# Patient Record
Sex: FEMALE | Race: WHITE | URBAN - METROPOLITAN AREA
[De-identification: names, ages, dates, MRNs, and addresses within clinical notes are randomized per-mention and may not be internally consistent; named-entity substitution may affect disease eponyms.]

---

## 2021-02-18 ENCOUNTER — OFFICE VISIT (OUTPATIENT)
Dept: NEPHROLOGY | Facility: MEDICAL CENTER | Age: 81
End: 2021-02-18
Payer: MEDICARE

## 2021-02-18 VITALS
RESPIRATION RATE: 16 BRPM | HEART RATE: 84 BPM | BODY MASS INDEX: 29.64 KG/M2 | OXYGEN SATURATION: 94 % | TEMPERATURE: 97.7 F | WEIGHT: 147 LBS | SYSTOLIC BLOOD PRESSURE: 136 MMHG | DIASTOLIC BLOOD PRESSURE: 74 MMHG | HEIGHT: 59 IN

## 2021-02-18 DIAGNOSIS — E55.9 VITAMIN D DEFICIENCY: ICD-10-CM

## 2021-02-18 DIAGNOSIS — I10 ESSENTIAL HYPERTENSION: ICD-10-CM

## 2021-02-18 DIAGNOSIS — N25.81 HYPERPARATHYROIDISM DUE TO RENAL INSUFFICIENCY (HCC): ICD-10-CM

## 2021-02-18 DIAGNOSIS — D64.9 ANEMIA, UNSPECIFIED TYPE: ICD-10-CM

## 2021-02-18 DIAGNOSIS — N18.4 CKD (CHRONIC KIDNEY DISEASE), STAGE IV (HCC): ICD-10-CM

## 2021-02-18 PROCEDURE — 99204 OFFICE O/P NEW MOD 45 MIN: CPT | Performed by: INTERNAL MEDICINE

## 2021-02-18 RX ORDER — CHOLECALCIFEROL (VITAMIN D3) 1MM UNIT/G
LIQUID (ML) MISCELLANEOUS
COMMUNITY

## 2021-02-18 RX ORDER — NYSTATIN 100000 U/G
100000 CREAM TOPICAL
COMMUNITY
Start: 2021-01-18

## 2021-02-18 RX ORDER — LANOLIN ALCOHOL/MO/W.PET/CERES
1000 CREAM (GRAM) TOPICAL DAILY
COMMUNITY

## 2021-02-18 RX ORDER — LEVOTHYROXINE SODIUM 0.1 MG/1
100 TABLET ORAL
COMMUNITY
Start: 2020-12-02

## 2021-02-18 RX ORDER — OXICONAZOLE NITRATE 10 MG/G
CREAM TOPICAL
COMMUNITY

## 2021-02-18 RX ORDER — OLOPATADINE HYDROCHLORIDE 7 MG/ML
0.7 SOLUTION OPHTHALMIC
COMMUNITY
Start: 2020-11-30

## 2021-02-18 RX ORDER — ONDANSETRON 4 MG/1
4 TABLET, ORALLY DISINTEGRATING ORAL
COMMUNITY
Start: 2021-01-06

## 2021-02-18 RX ORDER — AMLODIPINE BESYLATE 2.5 MG/1
2.5 TABLET ORAL DAILY
COMMUNITY
End: 2022-03-10

## 2021-02-18 RX ORDER — SCOLOPAMINE TRANSDERMAL SYSTEM 1 MG/1
1 PATCH, EXTENDED RELEASE TRANSDERMAL
COMMUNITY
End: 2023-08-09

## 2021-02-18 ASSESSMENT — ENCOUNTER SYMPTOMS
HEMOPTYSIS: 0
SINUS PAIN: 0
NAUSEA: 1
WEIGHT LOSS: 0
EYES NEGATIVE: 1
BACK PAIN: 1
COUGH: 0
MYALGIAS: 1
NECK PAIN: 0
CHILLS: 0
DIARRHEA: 0
FLANK PAIN: 0
ABDOMINAL PAIN: 0
WHEEZING: 0
ORTHOPNEA: 0
SHORTNESS OF BREATH: 0
PALPITATIONS: 0
FEVER: 0
VOMITING: 0

## 2021-02-19 NOTE — PROGRESS NOTES
Subjective:      Rosa Umanzor is a 80 y.o. female who presents with New Patient and Chronic Kidney Disease            HPI  Rosa is coming today for initial evaluation of CKD III with progression to stage IV  CKD: creat 1.3 in Oct 2020 - worse in Jan 2021 to 1.7-1.8  Had UTI treated with Keflex  Also sopped Lisinopril replaced with CCB  Doing better, no complaints except recurrent nausea.  No vomiting , no abdominal pain, no diarrhea  No dysuria/hematuria/flank pain  HTN: BP well controlled    Review of Systems   Constitutional: Negative for chills, fever, malaise/fatigue and weight loss.   HENT: Negative for congestion, hearing loss and sinus pain.    Eyes: Negative.    Respiratory: Negative for cough, hemoptysis, shortness of breath and wheezing.    Cardiovascular: Negative for chest pain, palpitations, orthopnea and leg swelling.   Gastrointestinal: Positive for nausea. Negative for abdominal pain, diarrhea and vomiting.   Genitourinary: Negative for dysuria, flank pain, frequency, hematuria and urgency.   Musculoskeletal: Positive for back pain and myalgias. Negative for neck pain.   Skin: Negative.    All other systems reviewed and are negative.    Past Medical History:   Diagnosis Date   • Arthritis    • Hyperlipidemia    • Hypertension    • Kidney disease        Family History   Problem Relation Age of Onset   • Heart Disease Mother        Social History     Socioeconomic History   • Marital status:      Spouse name: Not on file   • Number of children: Not on file   • Years of education: Not on file   • Highest education level: Not on file   Occupational History   • Not on file   Tobacco Use   • Smoking status: Never Smoker   • Smokeless tobacco: Never Used   Substance and Sexual Activity   • Alcohol use: Never   • Drug use: Never   • Sexual activity: Not on file   Other Topics Concern   • Not on file   Social History Narrative   • Not on file     Social Determinants of Health     Financial  "Resource Strain:    • Difficulty of Paying Living Expenses:    Food Insecurity:    • Worried About Running Out of Food in the Last Year:    • Ran Out of Food in the Last Year:    Transportation Needs:    • Lack of Transportation (Medical):    • Lack of Transportation (Non-Medical):    Physical Activity:    • Days of Exercise per Week:    • Minutes of Exercise per Session:    Stress:    • Feeling of Stress :    Social Connections:    • Frequency of Communication with Friends and Family:    • Frequency of Social Gatherings with Friends and Family:    • Attends [employer] Services:    • Active Member of Clubs or Organizations:    • Attends Club or Organization Meetings:    • Marital Status:    Intimate Partner Violence:    • Fear of Current or Ex-Partner:    • Emotionally Abused:    • Physically Abused:    • Sexually Abused:           Objective:     /74 (BP Location: Right arm, Patient Position: Sitting)   Pulse 84   Temp 36.5 °C (97.7 °F)   Resp 16   Ht 1.499 m (4' 11\")   Wt 66.7 kg (147 lb)   SpO2 94%   BMI 29.69 kg/m²      Physical Exam  Vitals and nursing note reviewed.   Constitutional:       General: She is not in acute distress.     Appearance: Normal appearance. She is well-developed. She is not diaphoretic.   HENT:      Head: Normocephalic and atraumatic.      Nose: Nose normal.      Mouth/Throat:      Mouth: Mucous membranes are moist.      Pharynx: Oropharynx is clear.   Eyes:      Extraocular Movements: Extraocular movements intact.      Conjunctiva/sclera: Conjunctivae normal.      Pupils: Pupils are equal, round, and reactive to light.   Cardiovascular:      Rate and Rhythm: Normal rate and regular rhythm.      Pulses: Normal pulses.      Heart sounds: Normal heart sounds. No friction rub. No gallop.    Pulmonary:      Effort: Pulmonary effort is normal. No respiratory distress.      Breath sounds: Normal breath sounds. No wheezing, rhonchi or rales.   Abdominal:      General: Bowel sounds are " normal. There is no distension.      Palpations: Abdomen is soft. There is no mass.      Tenderness: There is no abdominal tenderness. There is no right CVA tenderness, left CVA tenderness or guarding.   Musculoskeletal:         General: Normal range of motion.      Cervical back: Normal range of motion and neck supple.      Right lower leg: No edema.      Left lower leg: No edema.   Skin:     General: Skin is warm.      Coloration: Skin is not jaundiced.      Findings: No erythema or rash.   Neurological:      General: No focal deficit present.      Mental Status: She is alert and oriented to person, place, and time.      Cranial Nerves: No cranial nerve deficit.      Coordination: Coordination normal.   Psychiatric:         Mood and Affect: Mood normal.         Behavior: Behavior normal.         Thought Content: Thought content normal.         Judgment: Judgment normal.            Laboratory results and US reviewed: d/w Pt  Creat 1.8  US no hydronephrosis       Assessment/Plan:        1. CKD (chronic kidney disease), stage IV ([facility])      To monitor closely, to repeat renal panel and call clinic to discuss results  - Basic Metabolic Panel; Future  - PTH INTACT (PTH ONLY); Future  - MICROALB/CREAT RATIO, RANDOM UR  - REFERRAL TO TELEMEDICINE    2. Essential hypertension      BP well controlled -to monitor  - Basic Metabolic Panel; Future  - MICROALB/CREAT RATIO, RANDOM UR  - REFERRAL TO TELEMEDICINE    3. Anemia, unspecified type      Hb stable -to monitor  - REFERRAL TO TELEMEDICINE    4. Vitamin D deficiency        - VITAMIN D 25-HYDROXY  - REFERRAL TO TELEMEDICINE    5. Hyperparathyroidism due to renal insufficiency (HCC)    - PTH INTACT (PTH ONLY); Future  - VITAMIN D 25-HYDROXY  - REFERRAL TO TELEMEDICINE    Recs:  Keep well hydrated  Low salt diet  Monitor BP  Avoid NSAID's  Repeat renal panel  F/u in 1 month

## 2021-03-05 ENCOUNTER — TELEPHONE (OUTPATIENT)
Dept: NEPHROLOGY | Facility: MEDICAL CENTER | Age: 81
End: 2021-03-05

## 2021-03-31 ENCOUNTER — TELEMEDICINE2 (OUTPATIENT)
Dept: NEPHROLOGY | Facility: MEDICAL CENTER | Age: 81
End: 2021-03-31
Payer: MEDICARE

## 2021-03-31 VITALS
BODY MASS INDEX: 29.31 KG/M2 | SYSTOLIC BLOOD PRESSURE: 126 MMHG | HEART RATE: 71 BPM | DIASTOLIC BLOOD PRESSURE: 67 MMHG | WEIGHT: 145.4 LBS | OXYGEN SATURATION: 93 % | TEMPERATURE: 98.3 F | HEIGHT: 59 IN

## 2021-03-31 DIAGNOSIS — I10 ESSENTIAL HYPERTENSION: ICD-10-CM

## 2021-03-31 DIAGNOSIS — N18.32 STAGE 3B CHRONIC KIDNEY DISEASE: ICD-10-CM

## 2021-03-31 DIAGNOSIS — E55.9 VITAMIN D DEFICIENCY: ICD-10-CM

## 2021-03-31 DIAGNOSIS — D64.9 ANEMIA, UNSPECIFIED TYPE: ICD-10-CM

## 2021-03-31 PROCEDURE — 99214 OFFICE O/P EST MOD 30 MIN: CPT | Mod: 95 | Performed by: INTERNAL MEDICINE

## 2021-03-31 ASSESSMENT — ENCOUNTER SYMPTOMS
BACK PAIN: 1
CHILLS: 0
HEMOPTYSIS: 0
MYALGIAS: 1
SINUS PAIN: 0
FLANK PAIN: 0
PALPITATIONS: 0
ABDOMINAL PAIN: 0
NAUSEA: 1
COUGH: 0
FEVER: 0
WHEEZING: 0
EYES NEGATIVE: 1
MYALGIAS: 0
DIARRHEA: 0
NAUSEA: 0
NECK PAIN: 0
SHORTNESS OF BREATH: 0
ORTHOPNEA: 0
VOMITING: 0
WEIGHT LOSS: 0

## 2021-03-31 NOTE — PROGRESS NOTES
Telemedicine Note     Please note that I could not evaluate the patient in person at the site. All examination and evaluation of the patient and information gathering from the patient and/or the family were done jointly by the requesting physician, Dr. Luz Elena vasquez. providers found at the site and me via licensed and securely encrypted tele-video communication equipment with the assistance of a trained tele-presenter at the originating site.  As such, my assessments and recommendations are limited as far as such telecommnication allows.     Consulting MD: Christelle Romo M.D.    Consulting MD location: [place], [place]  Requesting Physician: Good Mayer DO  Patient name:      Rosa Umanzor  :                    1940   MRN:                   @  [id number]  Data source:  Patient  Video Time:         15 minutes.   Originating site: [facility] site [place]     Date of Service: 3/31/2021     Chief Complaint:   Chief Complaint   Patient presents with   • Chronic Kidney Disease        History of Present Illness:  Rosa Umanzor is a 80 y.o. female who presents today for    CKD III: creat 1.3 in Oct 2020 - worse in 2021 to 1.7-1.8 -improved to 1.3  Doing better, no complaints except low back pain -No NSAID's  No vomiting , no abdominal pain, no diarrhea  No dysuria/hematuria/flank pain  HTN: BP well controlled         ROS:  (Change to below. Must have 10 or more organ system for the highest level code).   Review of Systems   Constitutional: Negative for chills, fever, malaise/fatigue and weight loss.   HENT: Negative for congestion, hearing loss and sinus pain.    Eyes: Negative.    Respiratory: Negative for cough, hemoptysis, shortness of breath and wheezing.    Cardiovascular: Negative for chest pain, palpitations, orthopnea and leg swelling.   Gastrointestinal: Negative for abdominal pain, diarrhea, nausea and vomiting.   Musculoskeletal: Positive for back pain. Negative for myalgias and neck pain.   Skin:  "Negative.                Current Outpatient Medications:   •  levothyroxine (SYNTHROID) 100 MCG Tab, 100 mcg., Disp: , Rfl:   •  nystatin (MYCOSTATIN) 862229 UNIT/GM Cream topical cream, 100,000 g., Disp: , Rfl:   •  ondansetron (ZOFRAN ODT) 4 MG TABLET DISPERSIBLE, 4 mg., Disp: , Rfl:   •  PAZEO 0.7 % Solution, 0.7 %., Disp: , Rfl:   •  amLODIPine (NORVASC) 2.5 MG Tab, Take 2.5 mg by mouth every day., Disp: , Rfl:   •  scopolamine (TRANSDERM-SCOP, 1.5 MG,) 1 mg/72hr PATCH 72 HR, Place 1 Patch on the skin every 72 hours., Disp: , Rfl:   •  Cyanocobalamin (VITAMIN B-12) 1000 MCG Tab, Take 1,000 mcg by mouth every day., Disp: , Rfl:   •  Cholecalciferol (VITAMIN D3) 2932951 UNIT/GM Liquid, , Disp: , Rfl:   •  Oxiconazole Nitrate 1 % Cream, Apply  topically., Disp: , Rfl:   •  Magnesium 400 MG Cap, Take  by mouth., Disp: , Rfl:         Past Medical History:  Past Medical History:   Diagnosis Date   • Arthritis    • Hyperlipidemia    • Hypertension    • Kidney disease        Allergies:   Allergies   Allergen Reactions   • Amoxicillin    • Bactrim Ds    • Celexa    • Cleocin-T    • Codeine    • Crestor [Rosuvastatin Calcium]    • Cymbalta [Duloxetine Hcl]    • Darvocet [Propoxyphene N-Apap]    • Fentanyl    • Hydrocodone    • Keflex    • Niacin    • Nucynta [Tapentadol]    • Ultram [Tramadol]    • Vicodin [Hydrocodone-Acetaminophen]    • Vytorin         Current Outpatient Medications:   reviewed        Physical Exam:   Vitals:   Vitals:    03/31/21 1020   BP: 126/67   Pulse: 71   Temp: 36.8 °C (98.3 °F)   SpO2: 93%   Weight: 66 kg (145 lb 6.4 oz)   Height: 1.499 m (4' 11\")     Completed by medical staff  GEN: comfortable, in NAD   HEENT: Hearings appears normal   Oropharynx clear, nose normal  NECK: appears supple, unable to check for thyroid or lymphadenopathy.  CV: RRR, no rub/S3   PULM: CTA, no rales, no wheezes, good effort  ABD: appears soft, nontender, not distended.  Ext: unable to check but RN reports 2+ pedal " pulses.  SKIN: No obvious rash or lesion noted.   Psychiatric: normal mood and affect, alert and oriented to self, place, persons and time.           Imaging reviewed & Visualized by me:  DIAGNOSTIC IMAGING REVIEWED AND/OR INTERPRETED BY ME       Laboratory:   reviewed               Assessment and Plan:        1. Stage 3b chronic kidney disease      Creat level improved -back to baseline -at 1.3    2. Essential hypertension      BP very well controlled    3. Anemia, unspecified type      Well controlled    4. Vitamin D deficiency      At goal -continue supplementation      Recs:     Continue current treatment  Monitor BP    Low salt diet  Avoid NSAID's    RTC in 4 months    I spent total of 15minutes on face-to-face via telemonitor more than half of which was spent coordinating this with patient as well as reviewing the images and labs results with the patientt, and answering all questions and explaining in details the assessment and recommendation sections above. The patient expressed her understanding and agreement to the above.        This EM service was conducted utilizing secure and encrypted videoconferencing equipment with the assistance of a trained tele-presenter at the originating site.

## 2021-03-31 NOTE — PROGRESS NOTES
Subjective:      Rosa Umanzor is a 80 y.o. female who presents with Chronic Kidney Disease            Chronic Kidney Disease  Associated symptoms include myalgias and nausea. Pertinent negatives include no abdominal pain, chest pain, chills, congestion, coughing, fever, neck pain or vomiting.     Rosa is coming today for initial evaluation of CKD III with progression to stage IV  CKD: creat 1.3 in Oct 2020 - worse in Jan 2021 to 1.7-1.8  Had UTI treated with Keflex  Also sopped Lisinopril replaced with CCB  Doing better, no complaints except recurrent nausea.  No vomiting , no abdominal pain, no diarrhea  No dysuria/hematuria/flank pain  HTN: BP well controlled    Review of Systems   Constitutional: Negative for chills, fever, malaise/fatigue and weight loss.   HENT: Negative for congestion, hearing loss and sinus pain.    Eyes: Negative.    Respiratory: Negative for cough, hemoptysis, shortness of breath and wheezing.    Cardiovascular: Negative for chest pain, palpitations, orthopnea and leg swelling.   Gastrointestinal: Positive for nausea. Negative for abdominal pain, diarrhea and vomiting.   Genitourinary: Negative for dysuria, flank pain, frequency, hematuria and urgency.   Musculoskeletal: Positive for back pain and myalgias. Negative for neck pain.   Skin: Negative.    All other systems reviewed and are negative.    Past Medical History:   Diagnosis Date   • Arthritis    • Hyperlipidemia    • Hypertension    • Kidney disease        Family History   Problem Relation Age of Onset   • Heart Disease Mother        Social History     Socioeconomic History   • Marital status:      Spouse name: Not on file   • Number of children: Not on file   • Years of education: Not on file   • Highest education level: Not on file   Occupational History   • Not on file   Tobacco Use   • Smoking status: Never Smoker   • Smokeless tobacco: Never Used   Substance and Sexual Activity   • Alcohol use: Never   • Drug use:  Never   • Sexual activity: Not on file   Other Topics Concern   • Not on file   Social History Narrative   • Not on file     Social Determinants of Health     Financial Resource Strain:    • Difficulty of Paying Living Expenses:    Food Insecurity:    • Worried About Running Out of Food in the Last Year:    • Ran Out of Food in the Last Year:    Transportation Needs:    • Lack of Transportation (Medical):    • Lack of Transportation (Non-Medical):    Physical Activity:    • Days of Exercise per Week:    • Minutes of Exercise per Session:    Stress:    • Feeling of Stress :    Social Connections:    • Frequency of Communication with Friends and Family:    • Frequency of Social Gatherings with Friends and Family:    • Attends [employer] Services:    • Active Member of Clubs or Organizations:    • Attends Club or Organization Meetings:    • Marital Status:    Intimate Partner Violence:    • Fear of Current or Ex-Partner:    • Emotionally Abused:    • Physically Abused:    • Sexually Abused:           Objective:     There were no vitals taken for this visit.     Physical Exam  Vitals and nursing note reviewed.   Constitutional:       General: She is not in acute distress.     Appearance: Normal appearance. She is well-developed. She is not diaphoretic.   HENT:      Head: Normocephalic and atraumatic.      Nose: Nose normal.      Mouth/Throat:      Mouth: Mucous membranes are moist.      Pharynx: Oropharynx is clear.   Eyes:      Extraocular Movements: Extraocular movements intact.      Conjunctiva/sclera: Conjunctivae normal.      Pupils: Pupils are equal, round, and reactive to light.   Cardiovascular:      Rate and Rhythm: Normal rate and regular rhythm.      Pulses: Normal pulses.      Heart sounds: Normal heart sounds. No friction rub. No gallop.    Pulmonary:      Effort: Pulmonary effort is normal. No respiratory distress.      Breath sounds: Normal breath sounds. No wheezing, rhonchi or rales.   Abdominal:       General: Bowel sounds are normal. There is no distension.      Palpations: Abdomen is soft. There is no mass.      Tenderness: There is no abdominal tenderness. There is no right CVA tenderness, left CVA tenderness or guarding.   Musculoskeletal:         General: Normal range of motion.      Cervical back: Normal range of motion and neck supple.      Right lower leg: No edema.      Left lower leg: No edema.   Skin:     General: Skin is warm.      Coloration: Skin is not jaundiced.      Findings: No erythema or rash.   Neurological:      General: No focal deficit present.      Mental Status: She is alert and oriented to person, place, and time.      Cranial Nerves: No cranial nerve deficit.      Coordination: Coordination normal.   Psychiatric:         Mood and Affect: Mood normal.         Behavior: Behavior normal.         Thought Content: Thought content normal.         Judgment: Judgment normal.            Laboratory results and US reviewed: d/w Pt  Creat 1.8  US no hydronephrosis       Assessment/Plan:        1. CKD (chronic kidney disease), stage IV ([facility])      To monitor closely, to repeat renal panel and call clinic to discuss results  - Basic Metabolic Panel; Future  - PTH INTACT (PTH ONLY); Future  - MICROALB/CREAT RATIO, RANDOM UR  - REFERRAL TO TELEMEDICINE    2. Essential hypertension      BP well controlled -to monitor  - Basic Metabolic Panel; Future  - MICROALB/CREAT RATIO, RANDOM UR  - REFERRAL TO TELEMEDICINE    3. Anemia, unspecified type      Hb stable -to monitor  - REFERRAL TO TELEMEDICINE    4. Vitamin D deficiency        - VITAMIN D 25-HYDROXY  - REFERRAL TO TELEMEDICINE    5. Hyperparathyroidism due to renal insufficiency (HCC)    - PTH INTACT (PTH ONLY); Future  - VITAMIN D 25-HYDROXY  - REFERRAL TO TELEMEDICINE    Recs:  Keep well hydrated  Low salt diet  Monitor BP  Avoid NSAID's  Repeat renal panel  F/u in 1 month

## 2021-04-02 ENCOUNTER — TELEPHONE (OUTPATIENT)
Dept: NEPHROLOGY | Facility: MEDICAL CENTER | Age: 81
End: 2021-04-02

## 2021-04-02 NOTE — TELEPHONE ENCOUNTER
Patient left voicemail regarding her creatinine level. Patient was told during her tele health visit her creatinine level decreased and patient is curious as to what stage she is at now?   Please advise

## 2021-07-20 ENCOUNTER — TELEPHONE (OUTPATIENT)
Dept: NEPHROLOGY | Facility: MEDICAL CENTER | Age: 81
End: 2021-07-20

## 2021-07-20 NOTE — TELEPHONE ENCOUNTER
Pt states that she needs new lab orders and would like GLU & MAGNESIUM checked too. She also mentioned that she will need to schedule here 4mo [facility] for August. Is that okay?

## 2021-07-21 DIAGNOSIS — I10 ESSENTIAL HYPERTENSION: ICD-10-CM

## 2021-07-21 DIAGNOSIS — E55.9 VITAMIN D DEFICIENCY: ICD-10-CM

## 2021-07-21 DIAGNOSIS — D64.9 ANEMIA, UNSPECIFIED TYPE: ICD-10-CM

## 2021-07-21 DIAGNOSIS — N18.32 STAGE 3B CHRONIC KIDNEY DISEASE: ICD-10-CM

## 2021-08-18 ENCOUNTER — TELEPHONE (OUTPATIENT)
Dept: NEPHROLOGY | Facility: MEDICAL CENTER | Age: 81
End: 2021-08-18

## 2021-08-18 NOTE — TELEPHONE ENCOUNTER
Dr Romo patient... Taking Zyrtec OTC in am..    Wants to know if ok with her kidneys.    Please Advise    I can call her back.    thanks

## 2021-09-16 ENCOUNTER — OFFICE VISIT (OUTPATIENT)
Dept: NEPHROLOGY | Facility: MEDICAL CENTER | Age: 81
End: 2021-09-16
Payer: MEDICARE

## 2021-09-16 VITALS
HEIGHT: 59 IN | BODY MASS INDEX: 29.71 KG/M2 | WEIGHT: 147.38 LBS | DIASTOLIC BLOOD PRESSURE: 74 MMHG | SYSTOLIC BLOOD PRESSURE: 132 MMHG | HEART RATE: 66 BPM | OXYGEN SATURATION: 97 % | TEMPERATURE: 98.6 F

## 2021-09-16 DIAGNOSIS — N25.81 HYPERPARATHYROIDISM DUE TO RENAL INSUFFICIENCY (HCC): ICD-10-CM

## 2021-09-16 DIAGNOSIS — E55.9 VITAMIN D DEFICIENCY: ICD-10-CM

## 2021-09-16 DIAGNOSIS — N18.31 STAGE 3A CHRONIC KIDNEY DISEASE: ICD-10-CM

## 2021-09-16 DIAGNOSIS — D64.9 ANEMIA, UNSPECIFIED TYPE: ICD-10-CM

## 2021-09-16 DIAGNOSIS — I10 ESSENTIAL HYPERTENSION: ICD-10-CM

## 2021-09-16 DIAGNOSIS — R80.9 MICROALBUMINURIA: ICD-10-CM

## 2021-09-16 PROCEDURE — 99214 OFFICE O/P EST MOD 30 MIN: CPT | Performed by: INTERNAL MEDICINE

## 2021-09-16 RX ORDER — ALBUTEROL SULFATE 90 UG/1
AEROSOL, METERED RESPIRATORY (INHALATION) PRN
COMMUNITY
Start: 2021-07-16

## 2021-09-16 ASSESSMENT — ENCOUNTER SYMPTOMS
COUGH: 0
VOMITING: 0
HEMOPTYSIS: 0
ORTHOPNEA: 0
EYES NEGATIVE: 1
CHILLS: 0
NAUSEA: 1
WHEEZING: 0
SINUS PAIN: 0
FEVER: 0
ABDOMINAL PAIN: 0
DIARRHEA: 0
SHORTNESS OF BREATH: 0
PALPITATIONS: 0
WEIGHT LOSS: 0

## 2021-09-16 NOTE — PROGRESS NOTES
Subjective:      Rosa Umanzor is a 81 y.o. female who presents with Follow-Up and Chronic Kidney Disease            Chronic Kidney Disease  Associated symptoms include nausea. Pertinent negatives include no abdominal pain, chest pain, chills, congestion, coughing, fever or vomiting.     Rosa is coming today for f/u of CKD III   CKD: creat 1.3 in Oct 2020 - worse in Jan 2021 to 1.7-1.8 -improved to 1.3 -now at 1.1 -CKD IIIa  Doing well, no complaints except mild nausea  No vomiting , no abdominal pain, no diarrhea  No dysuria/hematuria/flank pain  HTN: BP well controlled    Review of Systems   Constitutional: Negative for chills, fever, malaise/fatigue and weight loss.   HENT: Negative for congestion, hearing loss and sinus pain.    Eyes: Negative.    Respiratory: Negative for cough, hemoptysis, shortness of breath and wheezing.    Cardiovascular: Negative for chest pain, palpitations, orthopnea and leg swelling.   Gastrointestinal: Positive for nausea. Negative for abdominal pain, diarrhea and vomiting.   Genitourinary: Negative for dysuria, frequency, hematuria and urgency.   Skin: Negative.    All other systems reviewed and are negative.    Past Medical History:   Diagnosis Date   • Arthritis    • Hyperlipidemia    • Hypertension    • Kidney disease        Family History   Problem Relation Age of Onset   • Heart Disease Mother        Social History     Socioeconomic History   • Marital status:      Spouse name: Not on file   • Number of children: Not on file   • Years of education: Not on file   • Highest education level: Not on file   Occupational History   • Not on file   Tobacco Use   • Smoking status: Never Smoker   • Smokeless tobacco: Never Used   Substance and Sexual Activity   • Alcohol use: Never   • Drug use: Never   • Sexual activity: Not on file   Other Topics Concern   • Not on file   Social History Narrative   • Not on file     Social Determinants of Health     Financial Resource Strain:   "  • Difficulty of Paying Living Expenses:    Food Insecurity:    • Worried About Running Out of Food in the Last Year:    • Ran Out of Food in the Last Year:    Transportation Needs:    • Lack of Transportation (Medical):    • Lack of Transportation (Non-Medical):    Physical Activity:    • Days of Exercise per Week:    • Minutes of Exercise per Session:    Stress:    • Feeling of Stress :    Social Connections:    • Frequency of Communication with Friends and Family:    • Frequency of Social Gatherings with Friends and Family:    • Attends [employer] Services:    • Active Member of Clubs or Organizations:    • Attends Club or Organization Meetings:    • Marital Status:    Intimate Partner Violence:    • Fear of Current or Ex-Partner:    • Emotionally Abused:    • Physically Abused:    • Sexually Abused:           Objective:     /74 (BP Location: Left arm, Patient Position: Sitting, BP Cuff Size: Adult)   Pulse 66   Temp 37 °C (98.6 °F) (Temporal)   Ht 1.499 m (4' 11\")   Wt 66.8 kg (147 lb 6 oz)   SpO2 97%   BMI 29.77 kg/m²      Physical Exam  Vitals reviewed.   Constitutional:       General: She is not in acute distress.     Appearance: Normal appearance. She is well-developed. She is not diaphoretic.   HENT:      Head: Normocephalic and atraumatic.      Nose: Nose normal.      Mouth/Throat:      Mouth: Mucous membranes are moist.      Pharynx: Oropharynx is clear.   Eyes:      General: No scleral icterus.     Extraocular Movements: Extraocular movements intact.      Conjunctiva/sclera: Conjunctivae normal.      Pupils: Pupils are equal, round, and reactive to light.   Cardiovascular:      Rate and Rhythm: Normal rate and regular rhythm.      Pulses: Normal pulses.      Heart sounds: Normal heart sounds. No friction rub. No gallop.    Pulmonary:      Effort: Pulmonary effort is normal. No respiratory distress.      Breath sounds: Normal breath sounds. No wheezing, rhonchi or rales.   Abdominal:      " General: Bowel sounds are normal. There is no distension.      Palpations: There is no mass.      Tenderness: There is no abdominal tenderness. There is no right CVA tenderness, left CVA tenderness or guarding.   Musculoskeletal:      Cervical back: Normal range of motion and neck supple.      Right lower leg: No edema.      Left lower leg: No edema.   Skin:     General: Skin is warm.   Neurological:      General: No focal deficit present.      Mental Status: She is alert and oriented to person, place, and time.      Cranial Nerves: No cranial nerve deficit.      Coordination: Coordination normal.   Psychiatric:         Mood and Affect: Mood normal.         Behavior: Behavior normal.         Thought Content: Thought content normal.         Judgment: Judgment normal.            Laboratory results and US reviewed: d/w Pt  Creat 1.8  US no hydronephrosis       Assessment/Plan:        1. CKD (chronic kidney disease), stage IV (HCC) -improved to CKD IIIa      To monitor closely      Keep well hydrated      2. Essential hypertension      BP well controlled -to monitor    3. Anemia, unspecified type      Hb stable -to monitor      4. Vitamin D deficiency      Continue supplementation        5. Hyperparathyroidism due to renal insufficiency (HCC)      PTH well controlled -at goal      Recs:  Keep well hydrated  Low salt diet  Monitor BP  Avoid NSAID's  Repeat renal panel  F/u in 6 month

## 2022-03-10 ENCOUNTER — TELEMEDICINE2 (OUTPATIENT)
Dept: NEPHROLOGY | Facility: MEDICAL CENTER | Age: 82
End: 2022-03-10
Payer: MEDICARE

## 2022-03-10 VITALS
BODY MASS INDEX: 32.05 KG/M2 | HEART RATE: 78 BPM | WEIGHT: 159 LBS | TEMPERATURE: 98.5 F | HEIGHT: 59 IN | SYSTOLIC BLOOD PRESSURE: 147 MMHG | OXYGEN SATURATION: 92 % | RESPIRATION RATE: 12 BRPM | DIASTOLIC BLOOD PRESSURE: 68 MMHG

## 2022-03-10 DIAGNOSIS — E55.9 VITAMIN D DEFICIENCY: ICD-10-CM

## 2022-03-10 DIAGNOSIS — R80.9 MICROALBUMINURIA: ICD-10-CM

## 2022-03-10 DIAGNOSIS — I10 ESSENTIAL HYPERTENSION: ICD-10-CM

## 2022-03-10 DIAGNOSIS — N18.31 STAGE 3A CHRONIC KIDNEY DISEASE: ICD-10-CM

## 2022-03-10 DIAGNOSIS — D64.9 ANEMIA, UNSPECIFIED TYPE: ICD-10-CM

## 2022-03-10 PROCEDURE — 99213 OFFICE O/P EST LOW 20 MIN: CPT | Mod: 95 | Performed by: INTERNAL MEDICINE

## 2022-03-10 RX ORDER — VERAPAMIL HYDROCHLORIDE 200 MG/1
200 CAPSULE, EXTENDED RELEASE ORAL
COMMUNITY
Start: 2022-02-23

## 2022-03-10 ASSESSMENT — ENCOUNTER SYMPTOMS
VOMITING: 0
SORE THROAT: 0
HEMOPTYSIS: 0
ORTHOPNEA: 0
WEIGHT LOSS: 0
SHORTNESS OF BREATH: 0
NAUSEA: 0
EYES NEGATIVE: 1
ABDOMINAL PAIN: 0
FLANK PAIN: 0
SINUS PAIN: 0
COUGH: 0
PALPITATIONS: 0
WHEEZING: 0
CHILLS: 0
FEVER: 0

## 2022-03-10 NOTE — PROGRESS NOTES
Telemedicine Note     Please note that I could not evaluate the patient in person at the site. All examination and evaluation of the patient and information gathering from the patient and/or the family were done jointly by the requesting physician, Dr. Luz Elena vasquez. providers found at the site and me via licensed and securely encrypted tele-video communication equipment with the assistance of a trained tele-presenter at the originating site.  As such, my assessments and recommendations are limited as far as such telecommnication allows.     Consulting MD: Christelle Romo M.D.    Consulting MD location: [place], [place]  Requesting Physician: Luz Elena vasquez. providers found  Patient name:      Rosa Umanzor  :                    1940   MRN:                   @  [id number]  Data source:  Patient,   Video Time:    20 minutes.   Originating site: [employer]  Originating site [place]     Date of Service: 3/10/2022     Chief Complaint:   Chief Complaint   Patient presents with   • Follow-Up   • Chronic Kidney Disease        History of Present Illness:  Rosa Umanzor is a 81 y.o. female who presents today for CKD IIIa  CKD: creat 1.3 in Oct 2020 - worse in 2021 to 1.7-1.8 -improved to 1.3 -now at 1.1 -CKD IIIa -stable -baseline  C/o low energy level, weight gain  No vomiting , no abdominal pain, no diarrhea  No dysuria/hematuria/flank pain  HTN: slightly elevated SBP - replaced amlodipine with Verapamil         ROS: (Change to below. Must have 10 or more organ system for the highest level code).   Review of Systems   Constitutional: Positive for malaise/fatigue. Negative for chills, fever and weight loss.        Weight gain   HENT: Negative for congestion, hearing loss, sinus pain and sore throat.    Eyes: Negative.    Respiratory: Negative for cough, hemoptysis, shortness of breath and wheezing.    Cardiovascular: Negative for chest pain, palpitations, orthopnea and leg swelling.   Gastrointestinal: Negative for abdominal pain, nausea  "and vomiting.   Genitourinary: Negative for dysuria, flank pain, frequency, hematuria and urgency.   Skin: Negative.    All other systems reviewed and are negative.              Current Outpatient Medications:   •  Verapamil HCl  MG CAPSULE SR 24 HR, Take 200 mg by mouth., Disp: , Rfl:   •  albuterol 108 (90 Base) MCG/ACT Aero Soln inhalation aerosol, as needed., Disp: , Rfl:   •  levothyroxine (SYNTHROID) 100 MCG Tab, 100 mcg., Disp: , Rfl:   •  nystatin (MYCOSTATIN) 923169 UNIT/GM Cream topical cream, 100,000 g., Disp: , Rfl:   •  ondansetron (ZOFRAN ODT) 4 MG TABLET DISPERSIBLE, 4 mg., Disp: , Rfl:   •  PAZEO 0.7 % Solution, 0.7 %., Disp: , Rfl:   •  Cyanocobalamin (VITAMIN B-12) 1000 MCG Tab, Take 1,000 mcg by mouth every day., Disp: , Rfl:   •  Cholecalciferol (VITAMIN D3) 5082117 UNIT/GM Liquid, , Disp: , Rfl:   •  Oxiconazole Nitrate 1 % Cream, Apply  topically., Disp: , Rfl:   •  Magnesium 400 MG Cap, Take  by mouth., Disp: , Rfl:   •  scopolamine (TRANSDERM-SCOP) 1 mg/72hr PATCH 72 HR, Place 1 Patch on the skin every 72 hours. (Patient not taking: Reported on 3/10/2022), Disp: , Rfl:         Past Medical History:  Past Medical History:   Diagnosis Date   • Arthritis    • Hyperlipidemia    • Hypertension    • Kidney disease        Allergies:   Allergies   Allergen Reactions   • Amoxicillin    • Bactrim Ds    • Celexa    • Cleocin-T    • Codeine    • Crestor [Rosuvastatin Calcium]    • Cymbalta [Duloxetine Hcl]    • Darvocet [Propoxyphene N-Apap]    • Fentanyl    • Hydrocodone    • Keflex    • Niacin    • Nucynta [Tapentadol]    • Ultram [Tramadol]    • Vicodin [Hydrocodone-Acetaminophen]    • Vytorin         Current Outpatient Medications:   reviewed        Physical Exam:   Vitals:   Vitals:    03/10/22 1006   BP: 147/68   BP Location: Left arm   Patient Position: Sitting   Pulse: 78   Resp: 12   Temp: 36.9 °C (98.5 °F)   SpO2: 92%   Weight: 72.1 kg (159 lb)   Height: 1.499 m (4' 11\")     Completed by " medical staff  GEN: comfortable, in NAD   HEENT: Hearings appears normal to finger rubs b/l. NC/AT  NECK: appears supple, normal ROM  CV: RRR  PULM: good effort, no rales/wheezes  ABD: appears soft, nontender, not distended.  Ext: no edema  SKIN: No obvious rash or lesion noted.   Psychiatric: normal mood and affect, alert and oriented to self, place, persons and time.           Imaging reviewed & Visualized by me:  DIAGNOSTIC IMAGING REVIEWED AND/OR INTERPRETED BY ME:        Laboratory: reviewed, d/w pt        Creat level 1.1     Assessment and Plan:        1. Stage 3a chronic kidney disease (HCC)      Stable at baseline    2. Essential hypertension      To monitor BP at home -goal < 135/85    3. Anemia, unspecified type      Well controlled      Hb WNL    4. Vitamin D deficiency      Well controlled    5. Microalbuminuria      negative           Recs:  Continue current treatment  Low salt diet  Keep well hydrated  Monitor BP  F/u with Primary Doctor  Consider to check thyroid panel  RTC 4 months    I spent total of 20 minutes on face-to-face via telemonitor more than half of which was spent coordinating this with patient as well as reviewing the images and labs results with the patient, and answering all questions and explaining in details the assessment and recommendation sections above. The patient expressed her understanding and agreement to the above.        This EM service was conducted utilizing secure and encrypted videoconferencing equipment with the assistance of a trained tele-presenter at the originating site.

## 2022-03-10 NOTE — PATIENT INSTRUCTIONS
Continue current treatment  Low salt diet  Keep well hydrated  Monitor BP  F/u with Primary Doctor  Consider to check thyroid panel

## 2022-05-11 ENCOUNTER — TELEPHONE (OUTPATIENT)
Dept: NEPHROLOGY | Facility: MEDICAL CENTER | Age: 82
End: 2022-05-11

## 2022-05-11 DIAGNOSIS — R80.9 MICROALBUMINURIA: ICD-10-CM

## 2022-05-11 DIAGNOSIS — N18.32 STAGE 3B CHRONIC KIDNEY DISEASE: ICD-10-CM

## 2022-05-11 DIAGNOSIS — I10 ESSENTIAL HYPERTENSION: ICD-10-CM

## 2022-05-12 ENCOUNTER — TELEPHONE (OUTPATIENT)
Dept: NEPHROLOGY | Facility: MEDICAL CENTER | Age: 82
End: 2022-05-12

## 2022-05-12 NOTE — TELEPHONE ENCOUNTER
Patient called and requested an Hb A1c test to be ordered and she would complete along with other labs.    unable to place Hb A1c order due to insurance issues.   Called and left voicemail informing patient

## 2022-07-13 ENCOUNTER — TELEPHONE (OUTPATIENT)
Dept: SCHEDULING | Facility: IMAGING CENTER | Age: 82
End: 2022-07-13
Payer: COMMERCIAL

## 2022-07-13 NOTE — TELEPHONE ENCOUNTER
Alexandra Alvarado will call pt      Spoke to pt about upcoming appt. On the 27th and reminded about labs.    PT HAS A QUESTION ABOUT TAKING COLLAGEN AND IS IT BAD FOR KIDNEYS AND WOULD LIKE A CALL BACK, Thank you so much!

## 2022-07-27 ENCOUNTER — TELEMEDICINE2 (OUTPATIENT)
Dept: NEPHROLOGY | Facility: MEDICAL CENTER | Age: 82
End: 2022-07-27
Payer: MEDICARE

## 2022-07-27 VITALS
SYSTOLIC BLOOD PRESSURE: 143 MMHG | OXYGEN SATURATION: 91 % | TEMPERATURE: 98.4 F | BODY MASS INDEX: 31.04 KG/M2 | WEIGHT: 154 LBS | RESPIRATION RATE: 13 BRPM | HEIGHT: 59 IN | DIASTOLIC BLOOD PRESSURE: 73 MMHG | HEART RATE: 82 BPM

## 2022-07-27 DIAGNOSIS — E55.9 VITAMIN D DEFICIENCY: ICD-10-CM

## 2022-07-27 DIAGNOSIS — N18.32 STAGE 3B CHRONIC KIDNEY DISEASE: ICD-10-CM

## 2022-07-27 DIAGNOSIS — D64.9 ANEMIA, UNSPECIFIED TYPE: ICD-10-CM

## 2022-07-27 DIAGNOSIS — I10 ESSENTIAL HYPERTENSION: ICD-10-CM

## 2022-07-27 DIAGNOSIS — R80.9 MICROALBUMINURIA: ICD-10-CM

## 2022-07-27 PROCEDURE — 99213 OFFICE O/P EST LOW 20 MIN: CPT | Mod: 95 | Performed by: INTERNAL MEDICINE

## 2022-07-27 ASSESSMENT — ENCOUNTER SYMPTOMS
DIARRHEA: 1
EYES NEGATIVE: 1
BACK PAIN: 1
SHORTNESS OF BREATH: 0
VOMITING: 0
PALPITATIONS: 0
HEMOPTYSIS: 0
COUGH: 0
ORTHOPNEA: 0
FEVER: 0
NAUSEA: 0
ABDOMINAL PAIN: 0
WEIGHT LOSS: 0
WHEEZING: 0
FLANK PAIN: 0
SINUS PAIN: 0
CHILLS: 0

## 2022-07-27 NOTE — PROGRESS NOTES
Telemedicine Note     Please note that I could not evaluate the patient in person at the site. All examination and evaluation of the patient and information gathering from the patient and/or the family were done jointly by the requesting physician, Dr. Luz Elena vasquez. providers found at the site and me via licensed and securely encrypted tele-video communication equipment with the assistance of a trained tele-presenter at the originating site.  As such, my assessments and recommendations are limited as far as such telecommnication allows.     Consulting MD: Christelle Romo M.D.    Consulting MD location: [place], [place]  Requesting Physician: Luz Elena vasquez. providers found  Patient name:      Rosa Umanzor  :                    1940   MRN:                   @  [id number]  Data source:  Patient,   Video Time:    15 minutes.   Originating site: [facility]  Originating site [place]     Date of Service: 2022     Chief Complaint:   Chief Complaint   Patient presents with   • Follow-Up        History of Present Illness:  Rosa Umanzor is a 81 y.o. female who presents today for CKD IIIa  CKD: creat 1.3 in Oct 2020 - worse in 2021 to 1.7-1.8 -improved to 1.3 - improved to 1.1 -  No slightly worse to 1.3  Recently suffered  from severe diarrhea -feels better  No vomiting , no abdominal pain  No dysuria/hematuria/flank pain  HTN: elevated BP under better control         ROS: (Change to below. Must have 10 or more organ system for the highest level code).   Review of Systems   Constitutional: Negative for chills, fever, malaise/fatigue and weight loss.   HENT: Negative for congestion, hearing loss and sinus pain.    Eyes: Negative.    Respiratory: Negative for cough, hemoptysis, shortness of breath and wheezing.    Cardiovascular: Negative for chest pain, palpitations and orthopnea.   Gastrointestinal: Positive for diarrhea. Negative for abdominal pain, nausea and vomiting.   Genitourinary: Negative for dysuria, flank pain, frequency,  "hematuria and urgency.   Musculoskeletal: Positive for back pain and joint pain.   Skin: Negative.    All other systems reviewed and are negative.              Current Outpatient Medications:   •  Verapamil HCl  MG CAPSULE SR 24 HR, Take 200 mg by mouth., Disp: , Rfl:   •  albuterol 108 (90 Base) MCG/ACT Aero Soln inhalation aerosol, as needed., Disp: , Rfl:   •  levothyroxine (SYNTHROID) 100 MCG Tab, 100 mcg., Disp: , Rfl:   •  nystatin (MYCOSTATIN) 221936 UNIT/GM Cream topical cream, 100,000 g., Disp: , Rfl:   •  ondansetron (ZOFRAN ODT) 4 MG TABLET DISPERSIBLE, 4 mg., Disp: , Rfl:   •  PAZEO 0.7 % Solution, 0.7 %., Disp: , Rfl:   •  Cyanocobalamin (VITAMIN B-12) 1000 MCG Tab, Take 1,000 mcg by mouth every day., Disp: , Rfl:   •  Cholecalciferol (VITAMIN D3) [id number] UNIT/GM Liquid, , Disp: , Rfl:   •  Oxiconazole Nitrate 1 % Cream, Apply  topically., Disp: , Rfl:   •  Magnesium 400 MG Cap, Take  by mouth., Disp: , Rfl:   •  scopolamine (TRANSDERM-SCOP) 1 mg/72hr PATCH 72 HR, Place 1 Patch on the skin every 72 hours. (Patient not taking: No sig reported), Disp: , Rfl:         Past Medical History:  Past Medical History:   Diagnosis Date   • Arthritis    • Hyperlipidemia    • Hypertension    • Kidney disease        Allergies:   Allergies   Allergen Reactions   • Amoxicillin    • Bactrim Ds    • Celexa    • Cleocin-T    • Codeine    • Crestor [Rosuvastatin Calcium]    • Cymbalta [Duloxetine Hcl]    • Darvocet [Propoxyphene N-Apap]    • Fentanyl    • Hydrocodone    • Keflex    • Niacin    • Nucynta [Tapentadol]    • Ultram [Tramadol]    • Vicodin [Hydrocodone-Acetaminophen]    • Vytorin         Current Outpatient Medications:   reviewed        Physical Exam:   Vitals:   Vitals:    07/27/22 1103   BP: (!) 143/73   Pulse: 82   Resp: 13   Temp: 36.9 °C (98.4 °F)   TempSrc: Temporal   SpO2: 91%   Weight: 69.9 kg (154 lb)   Height: 1.499 m (4' 11\")     Completed by medical staff  GEN: JOHN female NAD, " confortable  HEENT: NC/AT, hearing normal  NECK: supple, normal ROM  CV: RRR  PULM: clear, good effort, no wheezes/rales  ABD: soft, not tender, BS (+)  Ext: positive for  edema  SKIN: No obvious rash or lesion noted.   Psychiatric: normal mood and affect, alert and oriented to self, place, persons and time.           Imaging reviewed & Visualized by me:  DIAGNOSTIC IMAGING REVIEWED AND/OR INTERPRETED BY ME:        Laboratory: reviewed, d/w pt        Creat level 1.1 -1.3     Assessment and Plan:        1. Stage 3b chronic kidney disease (HCC)      Slightly worse      Keep well hydrated    2. Essential hypertension      borderlline elevated SBP -to monitor    3. Anemia, unspecified type      Well controlled      Hb WNL    4. Vitamin D deficiency      Well controlled    5. Microalbuminuria      negative           Recs:  Continue current treatment  Low salt diet  Keep well hydrated  Monitor BP  F/u with Primary Doctor  Consider to check thyroid panel  RTC 4 months    I spent total of 15 minutes on face-to-face via telemonitor more than half of which was spent coordinating this with patient as well as reviewing the images and labs results with the patient, and answering all questions and explaining in details the assessment and recommendation sections above. The patient expressed her understanding and agreement to the above.        This EM service was conducted utilizing secure and encrypted videoconferencing equipment with the assistance of a trained tele-presenter at the originating site.

## 2022-08-02 ENCOUNTER — TELEPHONE (OUTPATIENT)
Dept: NEPHROLOGY | Facility: MEDICAL CENTER | Age: 82
End: 2022-08-02
Payer: COMMERCIAL

## 2022-08-02 NOTE — TELEPHONE ENCOUNTER
Patient is being advised to take an Iodine supplement.     Wants to talk to you first.. worried about her kidneys.    Please call

## 2023-01-24 ENCOUNTER — TELEPHONE (OUTPATIENT)
Dept: NEPHROLOGY | Facility: MEDICAL CENTER | Age: 83
End: 2023-01-24

## 2023-01-24 NOTE — TELEPHONE ENCOUNTER
Patient calling office to ask  if it is safe with her kidney function to take Claritin   Please advise,

## 2023-02-09 ENCOUNTER — TELEMEDICINE2 (OUTPATIENT)
Dept: NEPHROLOGY | Facility: MEDICAL CENTER | Age: 83
End: 2023-02-09
Payer: MEDICARE

## 2023-02-09 VITALS
HEIGHT: 59 IN | RESPIRATION RATE: 13 BRPM | TEMPERATURE: 98.1 F | SYSTOLIC BLOOD PRESSURE: 139 MMHG | OXYGEN SATURATION: 93 % | BODY MASS INDEX: 31.07 KG/M2 | DIASTOLIC BLOOD PRESSURE: 75 MMHG | HEART RATE: 74 BPM | WEIGHT: 154.13 LBS

## 2023-02-09 DIAGNOSIS — E55.9 VITAMIN D DEFICIENCY: ICD-10-CM

## 2023-02-09 DIAGNOSIS — D64.9 ANEMIA, UNSPECIFIED TYPE: ICD-10-CM

## 2023-02-09 DIAGNOSIS — R80.9 MICROALBUMINURIA: ICD-10-CM

## 2023-02-09 DIAGNOSIS — I10 ESSENTIAL HYPERTENSION: ICD-10-CM

## 2023-02-09 DIAGNOSIS — N18.31 STAGE 3A CHRONIC KIDNEY DISEASE: ICD-10-CM

## 2023-02-09 PROCEDURE — 99213 OFFICE O/P EST LOW 20 MIN: CPT | Performed by: INTERNAL MEDICINE

## 2023-02-09 RX ORDER — LORATADINE 10 MG/1
CAPSULE, LIQUID FILLED ORAL
COMMUNITY

## 2023-02-09 ASSESSMENT — ENCOUNTER SYMPTOMS
FLANK PAIN: 0
COUGH: 0
WEIGHT LOSS: 0
SHORTNESS OF BREATH: 0
ORTHOPNEA: 0
FEVER: 0
WHEEZING: 0
ABDOMINAL PAIN: 0
NAUSEA: 0
EYES NEGATIVE: 1
SINUS PAIN: 0
HEMOPTYSIS: 0
VOMITING: 0
PALPITATIONS: 0
CHILLS: 0

## 2023-02-09 NOTE — PROGRESS NOTES
Telemedicine Note     Please note that I could not evaluate the patient in person at the site. All examination and evaluation of the patient and information gathering from the patient and/or the family were done jointly by the requesting physician, Dr. Luz Elena vasquez. providers found at the site and me via licensed and securely encrypted tele-video communication equipment with the assistance of a trained tele-presenter at the originating site.  As such, my assessments and recommendations are limited as far as such telecommnication allows.     Consulting MD: Christelle Romo M.D.    Consulting MD location: [place], [place]  Requesting Physician: Luz Elena vasquez. providers found  Patient name:      Rosa Umanzor  :                    1940   MRN:                   @  [id number]  Data source:  Patient,   Video Time:    15 minutes.   Originating site: [facility]  Originating site [place]     Date of Service: 2022     Chief Complaint:   Chief Complaint   Patient presents with    Follow-Up    Chronic Kidney Disease        History of Present Illness:  Rosa Umanzor is a 81 y.o. female who presents today for CKD IIIa  Creat stable at baseline  Doing well  No vomiting , no abdominal pain  No dysuria/hematuria/flank pain  HTN: elevated BP under better control         ROS: (Change to below. Must have 10 or more organ system for the highest level code).   Review of Systems   Constitutional:  Negative for chills, fever, malaise/fatigue and weight loss.   HENT:  Negative for congestion, hearing loss and sinus pain.    Eyes: Negative.    Respiratory:  Negative for cough, hemoptysis, shortness of breath and wheezing.    Cardiovascular:  Negative for chest pain, palpitations, orthopnea and leg swelling.   Gastrointestinal:  Negative for abdominal pain, nausea and vomiting.   Genitourinary:  Negative for dysuria, flank pain, frequency, hematuria and urgency.   Skin: Negative.    All other systems reviewed and are negative.            Current  Outpatient Medications:     Coenzyme Q10-Vitamin E 100-300 MG-UNIT Chew Tab, Chew., Disp: , Rfl:     Loratadine (CLARITIN) 10 MG Cap, Take  by mouth., Disp: , Rfl:     Probiotic Product (PROBIOTIC-10 PO), Take  by mouth., Disp: , Rfl:     Verapamil HCl  MG CAPSULE SR 24 HR, Take 200 mg by mouth., Disp: , Rfl:     albuterol 108 (90 Base) MCG/ACT Aero Soln inhalation aerosol, as needed., Disp: , Rfl:     levothyroxine (SYNTHROID) 100 MCG Tab, 100 mcg., Disp: , Rfl:     ondansetron (ZOFRAN ODT) 4 MG TABLET DISPERSIBLE, 4 mg., Disp: , Rfl:     PAZEO 0.7 % Solution, 0.7 %., Disp: , Rfl:     Cyanocobalamin (VITAMIN B-12) 1000 MCG Tab, Take 1,000 mcg by mouth every day., Disp: , Rfl:     Cholecalciferol (VITAMIN D3) 5931448 UNIT/GM Liquid, , Disp: , Rfl:     Oxiconazole Nitrate 1 % Cream, Apply  topically., Disp: , Rfl:     Magnesium 400 MG Cap, Take  by mouth., Disp: , Rfl:     nystatin (MYCOSTATIN) 042697 UNIT/GM Cream topical cream, 100,000 g. (Patient not taking: Reported on 2/9/2023), Disp: , Rfl:     scopolamine (TRANSDERM-SCOP) 1 mg/72hr PATCH 72 HR, Place 1 Patch on the skin every 72 hours. (Patient not taking: Reported on 3/10/2022), Disp: , Rfl:         Past Medical History:  Past Medical History:   Diagnosis Date    Arthritis     Hyperlipidemia     Hypertension     Kidney disease        Allergies:   Allergies   Allergen Reactions    Amoxicillin     Bactrim Ds     Celexa     Cleocin-T     Codeine     Crestor [Rosuvastatin Calcium]     Cymbalta [Duloxetine Hcl]     Darvocet [Propoxyphene N-Apap]     Fentanyl     Hydrocodone     Keflex     Niacin     Nucynta [Tapentadol]     Ultram [Tramadol]     Vicodin [Hydrocodone-Acetaminophen]     Vytorin         Current Outpatient Medications:   reviewed        Physical Exam:   Vitals:   Vitals:    02/09/23 1136   BP: 139/75   BP Location: Left arm   Patient Position: Sitting   BP Cuff Size: Adult   Pulse: 74   Resp: 13   Temp: 36.7 °C (98.1 °F)   TempSrc: Temporal  "  SpO2: 93%   Weight: 69.9 kg (154 lb 2 oz)   Height: 1.499 m (4' 11\")     Completed by medical staff  GEN: WDWN female NAD, confortable  HEENT: NC/AT, hearing normal  NECK: supple, normal ROM  CV: RRR  PULM: clear, good effort, no wheezes/rales  ABD: soft, not tender, BS (+)  Ext: trace pedal edema  SKIN: No obvious rash or lesion noted.   Psychiatric: normal mood and affect, alert and oriented to self, place, persons and time.           Imaging reviewed & Visualized by me:  DIAGNOSTIC IMAGING REVIEWED AND/OR INTERPRETED BY ME:        Laboratory: reviewed, d/w pt        Creat level 1.2     Assessment and Plan:        1. Stage 3 chronic kidney disease (HCC)      Slightly worse      Keep well hydrated    2. Essential hypertension      borderlline elevated SBP -to monitor    3. Anemia, unspecified type      Well controlled      Hb WNL    4. Vitamin D deficiency      Well controlled    5. Microalbuminuria      negative           Recs:  Continue current treatment  Keep well hydrated  Low salt diet  Monitor BP  F/u 6 months    I spent total of 15 minutes on face-to-face via telemonitor more than half of which was spent coordinating this with patient as well as reviewing the images and labs results with the patient, and answering all questions and explaining in details the assessment and recommendation sections above. The patient expressed her understanding and agreement to the above.        This EM service was conducted utilizing secure and encrypted videoconferencing equipment with the assistance of a trained tele-presenter at the originating site.         "

## 2023-05-25 ENCOUNTER — TELEPHONE (OUTPATIENT)
Dept: NEPHROLOGY | Facility: MEDICAL CENTER | Age: 83
End: 2023-05-25
Payer: MEDICARE

## 2023-05-25 NOTE — TELEPHONE ENCOUNTER
Patient has been feeling weak and shaky for 2 days, went to urgent care. They said it may be viral Worried it may be her Kidneys.     Please Advise.

## 2023-08-09 ENCOUNTER — TELEMEDICINE2 (OUTPATIENT)
Dept: NEPHROLOGY | Facility: MEDICAL CENTER | Age: 83
End: 2023-08-09
Payer: MEDICARE

## 2023-08-09 VITALS
HEIGHT: 59 IN | SYSTOLIC BLOOD PRESSURE: 127 MMHG | HEART RATE: 71 BPM | BODY MASS INDEX: 31.08 KG/M2 | RESPIRATION RATE: 18 BRPM | WEIGHT: 154.2 LBS | TEMPERATURE: 97.7 F | OXYGEN SATURATION: 96 % | DIASTOLIC BLOOD PRESSURE: 70 MMHG

## 2023-08-09 DIAGNOSIS — I10 ESSENTIAL HYPERTENSION: ICD-10-CM

## 2023-08-09 DIAGNOSIS — E55.9 VITAMIN D DEFICIENCY: ICD-10-CM

## 2023-08-09 DIAGNOSIS — N18.31 STAGE 3A CHRONIC KIDNEY DISEASE: ICD-10-CM

## 2023-08-09 DIAGNOSIS — R80.9 MICROALBUMINURIA: ICD-10-CM

## 2023-08-09 DIAGNOSIS — D64.9 ANEMIA, UNSPECIFIED TYPE: ICD-10-CM

## 2023-08-09 PROCEDURE — 3074F SYST BP LT 130 MM HG: CPT | Mod: 95 | Performed by: INTERNAL MEDICINE

## 2023-08-09 PROCEDURE — 3078F DIAST BP <80 MM HG: CPT | Mod: 95 | Performed by: INTERNAL MEDICINE

## 2023-08-09 PROCEDURE — 99214 OFFICE O/P EST MOD 30 MIN: CPT | Mod: 95 | Performed by: INTERNAL MEDICINE

## 2023-08-09 ASSESSMENT — ENCOUNTER SYMPTOMS
ORTHOPNEA: 0
SINUS PAIN: 0
WEIGHT LOSS: 0
HEMOPTYSIS: 0
WHEEZING: 0
SHORTNESS OF BREATH: 0
NAUSEA: 0
PALPITATIONS: 0
FLANK PAIN: 0
CHILLS: 0
COUGH: 0
VOMITING: 0
ABDOMINAL PAIN: 0
FEVER: 0
EYES NEGATIVE: 1

## 2023-08-09 NOTE — PATIENT INSTRUCTIONS
Continue current treatment  Well keep hydrated  Low salt diet  Monitor BP at home and call clinic if BP > 135/85

## 2023-08-09 NOTE — PROGRESS NOTES
Telemedicine Note     Please note that I could not evaluate the patient in person at the site. All examination and evaluation of the patient and information gathering from the patient and/or the family were done jointly by the requesting physician, Dr. Luz Elena vasquez. providers found at the site and me via licensed and securely encrypted tele-video communication equipment with the assistance of a trained tele-presenter at the originating site.  As such, my assessments and recommendations are limited as far as such telecommnication allows.     Consulting MD: Christelle Romo M.D.    Consulting MD location: [place], [place]  Requesting Physician: Luz Elena vasquez. providers found  Patient name:      Rosa Umanzor  :                    1940   MRN:                   MRN@  [id number]  Data source:  Patient,   Video Time:         15 minutes.   Originating site: [facility]  Originating site [place]:      Date of Service: 2023     Chief Complaint:   Chief Complaint   Patient presents with    Follow-Up     Stage 3a chronic kidney disease (HCC), [facility] labs in media        History of Present Illness:  Rosa Umanzor is a 83 y.o. female who presents today for CKD IIIa f/u  Doing well, nop complaints  No dysuria/hematuria/flank pain  HTN: BP very well controlled -at goal  Compliant to BP medications, low salt diet  CKD : creat level stable at baseline 1.2  Microalbuminuria mild              ROS:  (Change to below. Must have 10 or more organ system for the highest level code).   Review of Systems   Constitutional:  Negative for chills, fever, malaise/fatigue and weight loss.   HENT:  Negative for congestion, hearing loss and sinus pain.    Eyes: Negative.    Respiratory:  Negative for cough, hemoptysis, shortness of breath and wheezing.    Cardiovascular:  Negative for chest pain, palpitations, orthopnea and leg swelling.   Gastrointestinal:  Negative for abdominal pain, nausea and vomiting.   Genitourinary:  Negative for dysuria, flank  pain, frequency, hematuria and urgency.   Skin: Negative.                Current Outpatient Medications:     Multiple Vitamins-Minerals (MULTI COMPLETE PO), Take  by mouth every day., Disp: , Rfl:     Coenzyme Q10-Vitamin E 100-300 MG-UNIT Chew Tab, Chew., Disp: , Rfl:     Loratadine (CLARITIN) 10 MG Cap, Take  by mouth., Disp: , Rfl:     Probiotic Product (PROBIOTIC-10 PO), Take  by mouth., Disp: , Rfl:     Verapamil HCl  MG CAPSULE SR 24 HR, Take 200 mg by mouth., Disp: , Rfl:     albuterol 108 (90 Base) MCG/ACT Aero Soln inhalation aerosol, as needed., Disp: , Rfl:     levothyroxine (SYNTHROID) 100 MCG Tab, 100 mcg., Disp: , Rfl:     nystatin (MYCOSTATIN) [id number] UNIT/GM Cream topical cream, 100,000 g., Disp: , Rfl:     ondansetron (ZOFRAN ODT) 4 MG TABLET DISPERSIBLE, 4 mg., Disp: , Rfl:     PAZEO 0.7 % Solution, 0.7 %., Disp: , Rfl:     Cyanocobalamin (VITAMIN B-12) 1000 MCG Tab, Take 1,000 mcg by mouth every day., Disp: , Rfl:     Cholecalciferol (VITAMIN D3) 0327388 UNIT/GM Liquid, , Disp: , Rfl:     Oxiconazole Nitrate 1 % Cream, Apply  topically., Disp: , Rfl:     Magnesium 400 MG Cap, Take 350 mg by mouth every day., Disp: , Rfl:         Past Medical History:  Past Medical History:   Diagnosis Date    Arthritis     Hyperlipidemia     Hypertension     Kidney disease        Allergies:   Allergies   Allergen Reactions    Amoxicillin     Bactrim Ds     Celexa     Cleocin-T     Codeine     Crestor [Rosuvastatin Calcium]     Cymbalta [Duloxetine Hcl]     Darvocet [Propoxyphene N-Apap]     Fentanyl     Hydrocodone     Keflex     Niacin     Nucynta [Tapentadol]     Ultram [Tramadol]     Vicodin [Hydrocodone-Acetaminophen]     Vytorin         Current Outpatient Medications:   reviewed        Physical Exam:   Vitals:   Vitals:    08/09/23 1101   BP: 127/70   BP Location: Left arm   Patient Position: Sitting   BP Cuff Size: Adult   Pulse: 71   Resp: 18   Temp: 36.5 °C (97.7 °F)   TempSrc: Temporal   SpO2: 96%  "  Weight: 69.9 kg (154 lb 3.2 oz)   Height: 1.499 m (4' 11\")     Completed by medical staff  GEN: comfortable, NAD  HEENT: Pupils unable to check.  Hearings appears normal   NECK: appears supple, unable to check for thyroid or lymphadenopathy.  CV: RRR  PULM: good effort, no distress, no rales  ABD: appears soft, nontender, not distended.  Ext: trace pedal edema  SKIN: No obvious rash or lesion noted.   Psychiatric: normal mood and affect, alert and oriented to self, place, persons and time.           Imaging reviewed & Visualized by me:  DIAGNOSTIC IMAGING REVIEWED AND/OR INTERPRETED BY ME:        Laboratory:   Hb 15.7  Creat level 1.2  BUN 17  Vit D 47         Assessment and Plan:        1. Stage 3a chronic kidney disease (HCC)      Creat level stable at baseline -to monitor      Keep well hydrated    2. Essential hypertension      BP very well controlled -continue monitor    3. Microalbuminuria      Mild -well controlled    4. Anemia, unspecified type      Hb stable -WNL    5. Vitamin D deficiency      Well controlled -continue supplementation      Recs:  Continue current treatment  Well keep hydrated  Low salt diet  Monitor BP at home and call clinic if BP > 135/85       RTC 6 months    I spent total of 15 minutes on face-to-face via telemonitor more than half of which was spent coordinating this with patient as well as reviewing the images and labs results with the patient, and answering all questions and explaining in details the assessment and recommendation sections above. The patient expressed her understanding and agreement to the above.        This EM service was conducted utilizing secure and encrypted videoconferencing equipment with the assistance of a trained tele-presenter at the originating site.          "

## 2024-02-01 ENCOUNTER — TELEPHONE (OUTPATIENT)
Dept: NEPHROLOGY | Facility: MEDICAL CENTER | Age: 84
End: 2024-02-01
Payer: MEDICARE

## 2024-02-01 RX ORDER — LISINOPRIL 5 MG/1
5 TABLET ORAL DAILY
Qty: 90 TABLET | Refills: 2 | Status: SHIPPED | OUTPATIENT
Start: 2024-02-01

## 2024-02-01 NOTE — TELEPHONE ENCOUNTER
Pt called stated that allergist wants to put pt back on lisinopril due to verapamil causing allergic issues, pt would like to know what you want to do .

## 2024-02-02 ENCOUNTER — TELEPHONE (OUTPATIENT)
Dept: NEPHROLOGY | Facility: MEDICAL CENTER | Age: 84
End: 2024-02-02
Payer: MEDICARE

## 2024-02-08 DIAGNOSIS — E55.9 VITAMIN D DEFICIENCY: ICD-10-CM

## 2024-02-08 DIAGNOSIS — I10 ESSENTIAL HYPERTENSION: ICD-10-CM

## 2024-02-08 DIAGNOSIS — R80.9 MICROALBUMINURIA: ICD-10-CM

## 2024-02-08 DIAGNOSIS — N18.31 STAGE 3A CHRONIC KIDNEY DISEASE: ICD-10-CM

## 2024-02-08 DIAGNOSIS — D64.9 ANEMIA, UNSPECIFIED TYPE: ICD-10-CM

## 2024-02-22 ENCOUNTER — TELEMEDICINE2 (OUTPATIENT)
Dept: NEPHROLOGY | Facility: MEDICAL CENTER | Age: 84
End: 2024-02-22
Payer: MEDICARE

## 2024-02-22 VITALS
TEMPERATURE: 98.5 F | BODY MASS INDEX: 30.8 KG/M2 | DIASTOLIC BLOOD PRESSURE: 67 MMHG | HEIGHT: 59 IN | HEART RATE: 79 BPM | SYSTOLIC BLOOD PRESSURE: 135 MMHG | OXYGEN SATURATION: 92 % | RESPIRATION RATE: 16 BRPM | WEIGHT: 152.8 LBS

## 2024-02-22 DIAGNOSIS — N18.31 STAGE 3A CHRONIC KIDNEY DISEASE: ICD-10-CM

## 2024-02-22 DIAGNOSIS — E55.9 VITAMIN D DEFICIENCY: ICD-10-CM

## 2024-02-22 DIAGNOSIS — I10 ESSENTIAL HYPERTENSION: ICD-10-CM

## 2024-02-22 DIAGNOSIS — D64.9 ANEMIA, UNSPECIFIED TYPE: ICD-10-CM

## 2024-02-22 DIAGNOSIS — R80.9 MICROALBUMINURIA: ICD-10-CM

## 2024-02-22 PROCEDURE — 3075F SYST BP GE 130 - 139MM HG: CPT | Mod: 95 | Performed by: INTERNAL MEDICINE

## 2024-02-22 PROCEDURE — 99213 OFFICE O/P EST LOW 20 MIN: CPT | Mod: 95 | Performed by: INTERNAL MEDICINE

## 2024-02-22 PROCEDURE — 3078F DIAST BP <80 MM HG: CPT | Mod: 95 | Performed by: INTERNAL MEDICINE

## 2024-02-22 ASSESSMENT — ENCOUNTER SYMPTOMS
SHORTNESS OF BREATH: 0
EYES NEGATIVE: 1
WHEEZING: 0
WEIGHT LOSS: 0
FEVER: 0
HEMOPTYSIS: 0
SINUS PAIN: 0
ABDOMINAL PAIN: 0
FLANK PAIN: 0
COUGH: 0
VOMITING: 0
ORTHOPNEA: 0
NAUSEA: 0
CHILLS: 0
DIARRHEA: 0
PALPITATIONS: 0

## 2024-02-22 NOTE — PATIENT INSTRUCTIONS
Continue current treatment  Monitor BP  Keep well hydrated  Low salt diet  Call clinic if UTI symptoms occur

## 2024-02-22 NOTE — PROGRESS NOTES
Telemedicine Note     Please note that I could not evaluate the patient in person at the site. All examination and evaluation of the patient and information gathering from the patient and/or the family were done jointly by the requesting physician, Dr. Luz Elena vasquez. providers found at the site and me via licensed and securely encrypted tele-video communication equipment with the assistance of a trained tele-presenter at the originating site.  As such, my assessments and recommendations are limited as far as such telecommnication allows.     Consulting MD: Christelle Romo M.D.    Consulting MD location: [place], [place]  Requesting Physician: Luz Elena vasquez. providers found  Patient name:      Rosa Umanzor  :                    1940   MRN:                   MRN@  [id number]  Data source:  Patient  Video Time:         15 minutes.   Originating site: [facility]  Originating site MA:      Date of Service: 2024     Chief Complaint:   Chief Complaint   Patient presents with    Follow-Up    Chronic Kidney Disease        History of Present Illness:  Rosa Umanzor is a 83 y.o. female who presents today for f/u of CKD IIIa  C/o rash involving arms of unclear etiology, Seen in Primary Clinic, Dermatology Clinic, Allergy Clinic  On Clindamycin  (+) chronic pedal edema controlled with low salt diet  No dysuria/hematuria/flank pain  CKD: creat level stable at baseline  Urine culture positive for EColi -asymptomatic -no treatment for now unless UTI symptoms occur  HTN: BP very well controlled  PTH at 60 -continue vit D              ROS:  (Change to below. Must have 10 or more organ system for the highest level code).   Review of Systems   Constitutional:  Negative for chills, fever, malaise/fatigue and weight loss.   HENT:  Negative for congestion, hearing loss and sinus pain.    Eyes: Negative.    Respiratory:  Negative for cough, hemoptysis, shortness of breath and wheezing.    Cardiovascular:  Positive for leg swelling. Negative for  chest pain, palpitations and orthopnea.   Gastrointestinal:  Negative for abdominal pain, diarrhea, nausea and vomiting.   Genitourinary:  Negative for dysuria, flank pain, frequency, hematuria and urgency.   Skin:  Positive for rash.   All other systems reviewed and are negative.              Current Outpatient Medications:     lisinopril (PRINIVIL) 5 MG Tab, Take 1 Tablet by mouth every day., Disp: 90 Tablet, Rfl: 2    Multiple Vitamins-Minerals (MULTI COMPLETE PO), Take  by mouth every day., Disp: , Rfl:     Coenzyme Q10-Vitamin E 100-300 MG-UNIT Chew Tab, Chew., Disp: , Rfl:     Loratadine (CLARITIN) 10 MG Cap, Take  by mouth., Disp: , Rfl:     Probiotic Product (PROBIOTIC-10 PO), Take  by mouth., Disp: , Rfl:     albuterol 108 (90 Base) MCG/ACT Aero Soln inhalation aerosol, as needed., Disp: , Rfl:     levothyroxine (SYNTHROID) 100 MCG Tab, 100 mcg., Disp: , Rfl:     nystatin (MYCOSTATIN) 529861 UNIT/GM Cream topical cream, 100,000 g., Disp: , Rfl:     ondansetron (ZOFRAN ODT) 4 MG TABLET DISPERSIBLE, 4 mg., Disp: , Rfl:     PAZEO 0.7 % Solution, 0.7 %., Disp: , Rfl:     Cyanocobalamin (VITAMIN B-12) 1000 MCG Tab, Take 1,000 mcg by mouth every day., Disp: , Rfl:     Cholecalciferol (VITAMIN D3) 4166667 UNIT/GM Liquid, , Disp: , Rfl:     Oxiconazole Nitrate 1 % Cream, Apply  topically., Disp: , Rfl:     Magnesium 400 MG Cap, Take 350 mg by mouth every day., Disp: , Rfl:     Verapamil HCl  MG CAPSULE SR 24 HR, Take 200 mg by mouth. (Patient not taking: Reported on 2/22/2024), Disp: , Rfl:         Past Medical History:  Past Medical History:   Diagnosis Date    Arthritis     Hyperlipidemia     Hypertension     Kidney disease        Allergies:   Allergies   Allergen Reactions    Amoxicillin     Bactrim Ds     Celexa     Cleocin-T     Codeine     Crestor [Rosuvastatin Calcium]     Cymbalta [Duloxetine Hcl]     Darvocet [Propoxyphene N-Apap]     Fentanyl     Hydrocodone     Keflex     Niacin     Nucynta  "[Tapentadol]     Ultram [Tramadol]     Vicodin [Hydrocodone-Acetaminophen]     Vytorin         Current Outpatient Medications:   (Not in a hospital admission)          Physical Exam:   Vitals:   Vitals:    02/22/24 1114   BP: 135/67   BP Location: Left arm   Patient Position: Sitting   BP Cuff Size: Adult   Pulse: 79   Resp: 16   Temp: 36.9 °C (98.5 °F)   TempSrc: Temporal   SpO2: 92%   Weight: 69.3 kg (152 lb 12.8 oz)   Height: 1.499 m (4' 11\")     Completed by medical staff  GEN: comfortable, in NAD   HEENT: Pupils unable to check.  Hearings appears normal   NECK: appears supple, unable to check for thyroid or lymphadenopathy.  CV: unable to auscultate.   PULM: unable to auscultate.  ABD: appears soft, nontender, not distended.  Ext: bilateral pedal edema  SKIN: rash over arms   Psychiatric: normal mood and affect, alert and oriented to self, place, persons and time.           Imaging reviewed & Visualized by me:  DIAGNOSTIC IMAGING REVIEWED AND/OR INTERPRETED BY ME:        Laboratory:   Creat 1.1  PTH 60  Urine culture EColi                      Assessment and Plan:         1. Stage 3 chronic kidney disease (HCC)      crat level stable at baseline      Keep well hydrated     2. Essential hypertension      BP well controlled -at goal to monitor at home     3. Anemia, unspecified type      Well controlled      Hb WNL     4. Vitamin D deficiency      vit D and PTH well controlled     5. Microalbuminuria      negative       Recs   Continue current treatment  Monitor BP  Keep well hydrated  Low salt diet  Call clinic if UTI symptoms occur       RTC 3-4 months    I spent total of 15 minutes on face-to-face via telemonitor more than half of which was spent coordinating this with patient as well as reviewing the images and labs results with the patient and answering all questions and explaining in details the assessment and recommendation sections above. The patient  expressed herunderstanding and agreement to the " above.        This EM service was conducted utilizing secure and encrypted videoconferencing equipment with the assistance of a trained tele-presenter at the originating site.

## 2024-04-15 ENCOUNTER — TELEPHONE (OUTPATIENT)
Dept: NEPHROLOGY | Facility: MEDICAL CENTER | Age: 84
End: 2024-04-15
Payer: MEDICARE

## 2024-04-15 NOTE — TELEPHONE ENCOUNTER
Pt called, stated that dermatologist wanted to prescribe Dupixent and she wanted to know if it is ok to take due to kidney health

## 2024-05-30 ENCOUNTER — TELEMEDICINE2 (OUTPATIENT)
Dept: NEPHROLOGY | Facility: MEDICAL CENTER | Age: 84
End: 2024-05-30
Payer: MEDICARE

## 2024-05-30 VITALS
BODY MASS INDEX: 28.43 KG/M2 | RESPIRATION RATE: 16 BRPM | HEART RATE: 90 BPM | HEIGHT: 59 IN | WEIGHT: 141 LBS | SYSTOLIC BLOOD PRESSURE: 148 MMHG | TEMPERATURE: 97.6 F | DIASTOLIC BLOOD PRESSURE: 60 MMHG | OXYGEN SATURATION: 91 %

## 2024-05-30 DIAGNOSIS — D64.9 ANEMIA, UNSPECIFIED TYPE: ICD-10-CM

## 2024-05-30 DIAGNOSIS — E87.1 HYPONATREMIA: ICD-10-CM

## 2024-05-30 DIAGNOSIS — I10 ESSENTIAL HYPERTENSION: ICD-10-CM

## 2024-05-30 DIAGNOSIS — N18.31 STAGE 3A CHRONIC KIDNEY DISEASE: ICD-10-CM

## 2024-05-30 DIAGNOSIS — E55.9 VITAMIN D DEFICIENCY: ICD-10-CM

## 2024-05-30 DIAGNOSIS — R80.9 MICROALBUMINURIA: ICD-10-CM

## 2024-05-30 RX ORDER — CLOBETASOL PROPIONATE 0.5 MG/G
0.05 OINTMENT TOPICAL
COMMUNITY
Start: 2024-05-01

## 2024-05-30 ASSESSMENT — ENCOUNTER SYMPTOMS
WEIGHT LOSS: 0
FEVER: 0
CONSTIPATION: 1
WHEEZING: 0
HEMOPTYSIS: 0
PALPITATIONS: 0
VOMITING: 0
EYES NEGATIVE: 1
DIARRHEA: 0
COUGH: 0
SHORTNESS OF BREATH: 0
SINUS PAIN: 0
ABDOMINAL PAIN: 0
CHILLS: 0
ORTHOPNEA: 0
FLANK PAIN: 0
NAUSEA: 0

## 2024-05-30 NOTE — PROGRESS NOTES
Telemedicine Note     Please note that I could not evaluate the patient in person at the site. All examination and evaluation of the patient and information gathering from the patient and/or the family were done jointly by the requesting physician, Dr. Luz Elena vasquez. providers found at the site and me via licensed and securely encrypted tele-video communication equipment with the assistance of a trained tele-presenter at the originating site.  As such, my assessments and recommendations are limited as far as such telecommnication allows.     Consulting MD: Christelle Romo M.D.    Consulting MD location: [place], [place]  Requesting Physician: Luz Elena vasquez. providers found  Patient name:      Rosa Umanzor  :                    1940   MRN:                   MRN@  [id number]  Data source:  Patient  Video Time:         15 minutes.   Originating site: [facility]  Originating site MA:      Date of Service: 2024     Chief Complaint:   Chief Complaint   Patient presents with    Chronic Kidney Disease        History of Present Illness:  Rosa Umanzor is a 83 y.o. female who presents today for f/u of CKD IIIa, hyponatremia  C/o fatigue, constipation  No dysuria/hematuria/flank pain  CKD: creat level stable at baseline  HTN: BP very well controlled  Seen in Allergy Clinic told allergic to protein   Seen in ER yesterday -found hyponatremia with Na 128 -on 24 Na 138  Admitted eat less, especially protein, drinking plenty of water              ROS:  (Change to below. Must have 10 or more organ system for the highest level code).   Review of Systems   Constitutional:  Positive for malaise/fatigue. Negative for chills, fever and weight loss.   HENT:  Negative for congestion, hearing loss and sinus pain.    Eyes: Negative.    Respiratory:  Negative for cough, hemoptysis, shortness of breath and wheezing.    Cardiovascular:  Negative for chest pain, palpitations, orthopnea and leg swelling.   Gastrointestinal:  Positive for  constipation. Negative for abdominal pain, diarrhea, nausea and vomiting.   Genitourinary:  Negative for dysuria, flank pain, frequency, hematuria and urgency.   Skin: Negative.    All other systems reviewed and are negative.              Current Outpatient Medications:     fluocinonide (LIDEX) 0.05 % Cream, Apply 0.05 Applications topically one time as needed (itching)., Disp: , Rfl:     clobetasol (TEMOVATE) 0.05 % Ointment, Apply 0.05 Applications topically one time as needed (itching)., Disp: , Rfl:     lisinopril (PRINIVIL) 5 MG Tab, Take 1 Tablet by mouth every day., Disp: 90 Tablet, Rfl: 2    Multiple Vitamins-Minerals (MULTI COMPLETE PO), Take  by mouth every day., Disp: , Rfl:     Coenzyme Q10-Vitamin E 100-300 MG-UNIT Chew Tab, Chew., Disp: , Rfl:     Loratadine (CLARITIN) 10 MG Cap, Take  by mouth., Disp: , Rfl:     Probiotic Product (PROBIOTIC-10 PO), Take  by mouth., Disp: , Rfl:     albuterol 108 (90 Base) MCG/ACT Aero Soln inhalation aerosol, as needed., Disp: , Rfl:     levothyroxine (SYNTHROID) 100 MCG Tab, 100 mcg., Disp: , Rfl:     nystatin (MYCOSTATIN) 823749 UNIT/GM Cream topical cream, 100,000 g., Disp: , Rfl:     ondansetron (ZOFRAN ODT) 4 MG TABLET DISPERSIBLE, 4 mg., Disp: , Rfl:     PAZEO 0.7 % Solution, 0.7 %., Disp: , Rfl:     Cyanocobalamin (VITAMIN B-12) 1000 MCG Tab, Take 1,000 mcg by mouth every day., Disp: , Rfl:     Cholecalciferol (VITAMIN D3) 2832612 UNIT/GM Liquid, , Disp: , Rfl:     Oxiconazole Nitrate 1 % Cream, Apply  topically., Disp: , Rfl:     Magnesium 400 MG Cap, Take 350 mg by mouth every day., Disp: , Rfl:         Past Medical History:  Past Medical History:   Diagnosis Date    Arthritis     Hyperlipidemia     Hypertension     Kidney disease        Allergies:   Allergies   Allergen Reactions    Amoxicillin     Bactrim Ds     Celexa     Cleocin-T     Codeine     Crestor [Rosuvastatin Calcium]     Cymbalta [Duloxetine Hcl]     Darvocet [Propoxyphene N-Apap]     Fentanyl   "   Hydrocodone     Keflex     Niacin     Nucynta [Tapentadol]     Ultram [Tramadol]     Vicodin [Hydrocodone-Acetaminophen]     Vytorin         Current Outpatient Medications:   (Not in a hospital admission)          Physical Exam:   Vitals:   Vitals:    05/30/24 1139   BP: (!) 148/60   BP Location: Right arm   Patient Position: Sitting   BP Cuff Size: Adult   Pulse: 90   Resp: 16   Temp: 36.4 °C (97.6 °F)   TempSrc: Temporal   SpO2: 91%   Weight: 64 kg (141 lb)   Height: 1.499 m (4' 11\")     Completed by medical staff  GEN: comfortable, in NAD   HEENT: Pupils unable to check.  Hearings appears normal   NECK: appears supple, unable to check for thyroid or lymphadenopathy.  CV: unable to auscultate.   PULM: unable to auscultate.  ABD: appears soft, nontender, not distended.  Ext: bilateral pedal edema  SKIN: rash over arms   Psychiatric: normal mood and affect, alert and oriented to self, place, persons and time.           Imaging reviewed & Visualized by me:  DIAGNOSTIC IMAGING REVIEWED AND/OR INTERPRETED BY ME:        Laboratory:   Creat 1.1  Na 138 -128             Assessment and Plan:         1. Stage 3 chronic kidney disease (HCC)      crat level stable at baseline      to monitor     2. Essential hypertension      SBP elevated today - well controlled at home     3. Anemia, unspecified type      Well controlled      Hb WNL     4. Vitamin D deficiency      vit D and PTH well controlled     5. Microalbuminuria      negative     6. Hyponatremia: avoid free water , increase solid food intake, protein intake d/w Allergology      Repeat renal panel next week    Recs     Continue current treatment  Avoid free water  Monitor BP and call clinic if BP > 135/85  Repeat renal panel on Monday -will follow    RTC 3 months    I spent total of 15 minutes on face-to-face via telemonitor more than half of which was spent coordinating this with patient as well as reviewing the images and labs results with the patient and answering " all questions and explaining in details the assessment and recommendation sections above. The patient  expressed herunderstanding and agreement to the above.        This EM service was conducted utilizing secure and encrypted videoconferencing equipment with the assistance of a trained tele-presenter at the originating site.

## 2024-05-30 NOTE — PATIENT INSTRUCTIONS
Continue current treatment  Avoid free water  Monitor BP and call clinic if BP > 135/85  Repeat renal panel on Monday -will follow

## 2024-08-29 ENCOUNTER — TELEMEDICINE2 (OUTPATIENT)
Dept: NEPHROLOGY | Facility: MEDICAL CENTER | Age: 84
End: 2024-08-29
Payer: MEDICARE

## 2024-08-29 VITALS
SYSTOLIC BLOOD PRESSURE: 114 MMHG | HEIGHT: 59 IN | OXYGEN SATURATION: 98 % | HEART RATE: 71 BPM | TEMPERATURE: 98 F | DIASTOLIC BLOOD PRESSURE: 52 MMHG | BODY MASS INDEX: 29.23 KG/M2 | RESPIRATION RATE: 16 BRPM | WEIGHT: 145 LBS

## 2024-08-29 DIAGNOSIS — R80.9 MICROALBUMINURIA: ICD-10-CM

## 2024-08-29 DIAGNOSIS — I10 ESSENTIAL HYPERTENSION: ICD-10-CM

## 2024-08-29 DIAGNOSIS — E55.9 VITAMIN D DEFICIENCY: ICD-10-CM

## 2024-08-29 DIAGNOSIS — E87.1 HYPONATREMIA: ICD-10-CM

## 2024-08-29 DIAGNOSIS — N18.32 STAGE 3B CHRONIC KIDNEY DISEASE: ICD-10-CM

## 2024-08-29 DIAGNOSIS — D64.9 ANEMIA, UNSPECIFIED TYPE: ICD-10-CM

## 2024-08-29 ASSESSMENT — ENCOUNTER SYMPTOMS
VOMITING: 0
COUGH: 0
ABDOMINAL PAIN: 0
EYES NEGATIVE: 1
DIARRHEA: 0
FLANK PAIN: 0
WHEEZING: 0
FEVER: 0
ORTHOPNEA: 0
SHORTNESS OF BREATH: 0
NAUSEA: 0
HEMOPTYSIS: 0
CHILLS: 0
SINUS PAIN: 0
PALPITATIONS: 0
WEIGHT LOSS: 0

## 2024-08-29 NOTE — PROGRESS NOTES
Telemedicine Note     Please note that I could not evaluate the patient in person at the site. All examination and evaluation of the patient and information gathering from the patient and/or the family were done jointly by the requesting physician, Dr. Luz Elena vasquez. providers found at the site and me via licensed and securely encrypted tele-video communication equipment with the assistance of a trained tele-presenter at the originating site.  As such, my assessments and recommendations are limited as far as such telecommnication allows.     Consulting MD: Christelle Romo M.D.    Consulting MD location: [place], [place]  Requesting Physician: Luz Elena vasquez. providers found  Patient name:      Rosa Umanzor  :                    1940   MRN:                   MRN@  [id number]  Data source:  Patient  Video Time:         15 minutes.   Originating site: [facility]  Originating site MA:      Date of Service: 2024     Chief Complaint:   Chief Complaint   Patient presents with    Chronic Kidney Disease        History of Present Illness:  Rosa Umanzor is a 84 y.o. female who presents today for f/u of CKD III b   Recently difficulties to control hypothyroidism   No dysuria/hematuria/flank pain  CKD: creat level slightly worse 1.1-1.3  HTN: BP very well controlled  Seen in Allergy Clinic told allergic to protein   Hyponatremia -improved -under better control                ROS:  (Change to below. Must have 10 or more organ system for the highest level code).   Review of Systems   Constitutional:  Negative for chills, fever, malaise/fatigue and weight loss.   HENT:  Negative for congestion, hearing loss and sinus pain.    Eyes: Negative.    Respiratory:  Negative for cough, hemoptysis, shortness of breath and wheezing.    Cardiovascular:  Negative for chest pain, palpitations, orthopnea and leg swelling.   Gastrointestinal:  Negative for abdominal pain, diarrhea, nausea and vomiting.   Genitourinary:  Negative for dysuria, flank  pain, frequency, hematuria and urgency.   Skin: Negative.                Current Outpatient Medications:     fluocinonide (LIDEX) 0.05 % Cream, Apply 0.05 Applications topically one time as needed (itching)., Disp: , Rfl:     clobetasol (TEMOVATE) 0.05 % Ointment, Apply 0.05 Applications topically one time as needed (itching)., Disp: , Rfl:     lisinopril (PRINIVIL) 5 MG Tab, Take 1 Tablet by mouth every day., Disp: 90 Tablet, Rfl: 2    Multiple Vitamins-Minerals (MULTI COMPLETE PO), Take  by mouth every day., Disp: , Rfl:     Coenzyme Q10-Vitamin E 100-300 MG-UNIT Chew Tab, Chew., Disp: , Rfl:     Loratadine (CLARITIN) 10 MG Cap, Take  by mouth., Disp: , Rfl:     Probiotic Product (PROBIOTIC-10 PO), Take  by mouth., Disp: , Rfl:     albuterol 108 (90 Base) MCG/ACT Aero Soln inhalation aerosol, as needed., Disp: , Rfl:     nystatin (MYCOSTATIN) 710638 UNIT/GM Cream topical cream, 100,000 g., Disp: , Rfl:     ondansetron (ZOFRAN ODT) 4 MG TABLET DISPERSIBLE, 4 mg., Disp: , Rfl:     PAZEO 0.7 % Solution, 0.7 %., Disp: , Rfl:     Cyanocobalamin (VITAMIN B-12) 1000 MCG Tab, Take 1,000 mcg by mouth every day., Disp: , Rfl:     Cholecalciferol (VITAMIN D3) 7408970 UNIT/GM Liquid, , Disp: , Rfl:     Oxiconazole Nitrate 1 % Cream, Apply  topically., Disp: , Rfl:     Magnesium 400 MG Cap, Take 350 mg by mouth every day., Disp: , Rfl:         Past Medical History:  Past Medical History:   Diagnosis Date    Arthritis     Hyperlipidemia     Hypertension     Kidney disease        Allergies:   Allergies   Allergen Reactions    Amoxicillin     Bactrim Ds     Celexa     Cleocin-T     Codeine     Crestor [Rosuvastatin Calcium]     Cymbalta [Duloxetine Hcl]     Darvocet [Propoxyphene N-Apap]     Fentanyl     Hydrocodone     Keflex     Niacin     Nucynta [Tapentadol]     Ultram [Tramadol]     Vicodin [Hydrocodone-Acetaminophen]     Vytorin         Current Outpatient Medications:   reviewed        Physical Exam:   Vitals:   Vitals:     "08/29/24 1133   BP: 114/52   BP Location: Left arm   Patient Position: Sitting   BP Cuff Size: Adult   Pulse: 71   Resp: 16   Temp: 36.7 °C (98 °F)   TempSrc: Temporal   SpO2: 98%   Weight: 65.8 kg (145 lb)   Height: 1.499 m (4' 11\")     Completed by medical staff  GEN: WDWN, comfortable, NAD  HEENT: Hearings appears normal   NECK: appears supple, normal ROM  CV: RRR  PULM: good effort, no rales. No wheezes  ABD: soft, nontender, not distended.BS (+)  Ext: trace pedal edema  SKIN: no rash, no erythema  Psychiatric: normal mood and affect, alert and oriented to self, place, persons and time.           Imaging reviewed & Visualized by me:  DIAGNOSTIC IMAGING REVIEWED AND/OR INTERPRETED BY ME:        Laboratory:   Creat 1.1 -1.3  Na 138 -128 -1.34             Assessment and Plan:         1. Stage 3b chronic kidney disease (HCC)      crat level stable at baseline      to monitor     2. Essential hypertension      SBP elevated today - well controlled at home     3. Anemia, unspecified type      Well controlled      Hb WNL     4. Vitamin D deficiency      vit D well controlled        5. Microalbuminuria      negative     6. Hyponatremia: under better control - avoid free water , increase solid food intake          Recs       Continue current treatment  Avoid free water  Monitor BP and call clinic if BP > 135/85  Increase solid food intake  Endocrinology evaluation  RTC 3 -4 months    I spent total of 15 minutes on face-to-face via telemonitor more than half of which was spent coordinating this with patient as well as reviewing the images and labs results with the patient and answering all questions and explaining in details the assessment and recommendation sections above. The patient  expressed herunderstanding and agreement to the above.        This EM service was conducted utilizing secure and encrypted videoconferencing equipment with the assistance of a trained tele-presenter at the originating site.           "

## 2024-08-29 NOTE — PATIENT INSTRUCTIONS
Continue current treatment  Avoid free water  Monitor BP and call clinic if BP > 135/85  Increase solid food intake  Endocrinology evaluation

## 2024-11-07 ENCOUNTER — TELEPHONE (OUTPATIENT)
Dept: NEPHROLOGY | Facility: MEDICAL CENTER | Age: 84
End: 2024-11-07
Payer: MEDICARE

## 2024-11-07 NOTE — TELEPHONE ENCOUNTER
Pt called and stated that she was planning on going on a trip for the holidays but she would be going by airplane and wanted to make sure that it wouldn't cause any issues.

## 2024-12-12 ENCOUNTER — TELEMEDICINE2 (OUTPATIENT)
Dept: NEPHROLOGY | Facility: MEDICAL CENTER | Age: 84
End: 2024-12-12
Payer: MEDICARE

## 2024-12-12 ENCOUNTER — TELEPHONE (OUTPATIENT)
Dept: NEPHROLOGY | Facility: MEDICAL CENTER | Age: 84
End: 2024-12-12

## 2024-12-12 VITALS
DIASTOLIC BLOOD PRESSURE: 52 MMHG | SYSTOLIC BLOOD PRESSURE: 119 MMHG | HEART RATE: 74 BPM | RESPIRATION RATE: 16 BRPM | OXYGEN SATURATION: 94 % | WEIGHT: 149 LBS | TEMPERATURE: 97.6 F | HEIGHT: 59 IN | BODY MASS INDEX: 30.04 KG/M2

## 2024-12-12 DIAGNOSIS — D64.9 ANEMIA, UNSPECIFIED TYPE: ICD-10-CM

## 2024-12-12 DIAGNOSIS — E55.9 VITAMIN D DEFICIENCY: ICD-10-CM

## 2024-12-12 DIAGNOSIS — N18.32 STAGE 3B CHRONIC KIDNEY DISEASE: ICD-10-CM

## 2024-12-12 DIAGNOSIS — R80.9 MICROALBUMINURIA: ICD-10-CM

## 2024-12-12 DIAGNOSIS — I10 ESSENTIAL HYPERTENSION: ICD-10-CM

## 2024-12-12 DIAGNOSIS — E87.1 HYPONATREMIA: ICD-10-CM

## 2024-12-12 PROCEDURE — G2211 COMPLEX E/M VISIT ADD ON: HCPCS | Mod: 95 | Performed by: INTERNAL MEDICINE

## 2024-12-12 PROCEDURE — 3074F SYST BP LT 130 MM HG: CPT | Mod: 95 | Performed by: INTERNAL MEDICINE

## 2024-12-12 PROCEDURE — 3078F DIAST BP <80 MM HG: CPT | Mod: 95 | Performed by: INTERNAL MEDICINE

## 2024-12-12 PROCEDURE — 99213 OFFICE O/P EST LOW 20 MIN: CPT | Mod: 95 | Performed by: INTERNAL MEDICINE

## 2024-12-12 ASSESSMENT — ENCOUNTER SYMPTOMS
SINUS PAIN: 0
EYES NEGATIVE: 1
ORTHOPNEA: 0
CHILLS: 0
WHEEZING: 0
PALPITATIONS: 0
WEIGHT LOSS: 0
FLANK PAIN: 0
FEVER: 0
ABDOMINAL PAIN: 0
COUGH: 0
SHORTNESS OF BREATH: 0
HEMOPTYSIS: 0
NAUSEA: 0
VOMITING: 0

## 2024-12-12 NOTE — TELEPHONE ENCOUNTER
Pt wanted to know if she needs to increase her d3 supplement at all, she is currently taking 2000mg

## 2024-12-12 NOTE — PROGRESS NOTES
Telemedicine Note     Please note that I could not evaluate the patient in person at the site. All examination and evaluation of the patient and information gathering from the patient and/or the family were done jointly by the requesting physician, Dr. Luz Elena vasquez. providers found at the site and me via licensed and securely encrypted tele-video communication equipment with the assistance of a trained tele-presenter at the originating site.  As such, my assessments and recommendations are limited as far as such telecommnication allows.     Consulting MD: Christelle Romo M.D.    Consulting MD location: [place], [place]  Requesting Physician: Luz Elena vasquez. providers found  Patient name:      Rosa Umanzor  :                    1940   MRN:                   MRN@  [id number]  Data source:  Patient  Video Time:         15 minutes.   Originating site: [facility] [employer]  Originating site MA:      Date of Service: 2024     Chief Complaint:   Chief Complaint   Patient presents with    Chronic Kidney Disease        History of Present Illness:  Rosa Umanzor is a 84 y.o. female who presents today for f/u of CKD III b   Doing well, no complaints  No dysuria/hematuria/flank pain  CKD: creat level stable at 1.3 -baseline  HTN: BP very well controlled  Seen in Allergy Clinic told allergic to protein   Hyponatremia -improved -well controlled                ROS:  (Change to below. Must have 10 or more organ system for the highest level code).   Review of Systems   Constitutional:  Negative for chills, fever, malaise/fatigue and weight loss.   HENT:  Negative for congestion, hearing loss and sinus pain.    Eyes: Negative.    Respiratory:  Negative for cough, hemoptysis, shortness of breath and wheezing.    Cardiovascular:  Negative for chest pain, palpitations, orthopnea and leg swelling.   Gastrointestinal:  Negative for abdominal pain, nausea and vomiting.   Genitourinary:  Negative for dysuria, flank pain, frequency, hematuria and urgency.    Skin: Negative.    All other systems reviewed and are negative.              Current Outpatient Medications:     fluocinonide (LIDEX) 0.05 % Cream, Apply 0.05 Applications topically one time as needed (itching)., Disp: , Rfl:     clobetasol (TEMOVATE) 0.05 % Ointment, Apply 0.05 Applications topically one time as needed (itching)., Disp: , Rfl:     lisinopril (PRINIVIL) 5 MG Tab, Take 1 Tablet by mouth every day., Disp: 90 Tablet, Rfl: 2    Multiple Vitamins-Minerals (MULTI COMPLETE PO), Take  by mouth every day., Disp: , Rfl:     Coenzyme Q10-Vitamin E 100-300 MG-UNIT Chew Tab, Chew., Disp: , Rfl:     Loratadine (CLARITIN) 10 MG Cap, Take  by mouth., Disp: , Rfl:     Probiotic Product (PROBIOTIC-10 PO), Take  by mouth., Disp: , Rfl:     albuterol 108 (90 Base) MCG/ACT Aero Soln inhalation aerosol, as needed., Disp: , Rfl:     nystatin (MYCOSTATIN) 726842 UNIT/GM Cream topical cream, 100,000 g., Disp: , Rfl:     ondansetron (ZOFRAN ODT) 4 MG TABLET DISPERSIBLE, 4 mg., Disp: , Rfl:     PAZEO 0.7 % Solution, 0.7 %., Disp: , Rfl:     Cyanocobalamin (VITAMIN B-12) 1000 MCG Tab, Take 1,000 mcg by mouth every day., Disp: , Rfl:     Cholecalciferol (VITAMIN D3) 9939557 UNIT/GM Liquid, , Disp: , Rfl:     Oxiconazole Nitrate 1 % Cream, Apply  topically., Disp: , Rfl:     Magnesium 400 MG Cap, Take 350 mg by mouth every day., Disp: , Rfl:         Past Medical History:  Past Medical History:   Diagnosis Date    Arthritis     Hyperlipidemia     Hypertension     Kidney disease        Allergies:   Allergies   Allergen Reactions    Amoxicillin     Bactrim Ds     Celexa     Cleocin-T     Codeine     Crestor [Rosuvastatin Calcium]     Cymbalta [Duloxetine Hcl]     Darvocet [Propoxyphene N-Apap]     Eggs Unspecified     Not sure    Fentanyl     Hydrocodone     Keflex     Milk Protein Extract Unspecified     Not sure    Niacin     Nucynta [Tapentadol]     Ultram [Tramadol]     Vicodin [Hydrocodone-Acetaminophen]     Vytorin      "Wheat Unspecified     Not sure        Current Outpatient Medications:   reviewed        Physical Exam:   Vitals:   Vitals:    12/12/24 1131   BP: 119/52   BP Location: Left arm   Patient Position: Sitting   BP Cuff Size: Adult   Pulse: 74   Resp: 16   Temp: 36.4 °C (97.6 °F)   TempSrc: Temporal   SpO2: 94%   Weight: 67.6 kg (149 lb)   Height: 1.499 m (4' 11\")     Completed by medical staff  GEN: comfortable, NAD  HEENT: Hearings appears normal   NECK: appears supple, normal ROM  CV: RRR  PULM: good effort, no distress  ABD: soft, nontender, not distended.BS (+)  Ext: trace pedal edema  SKIN: no rash, no erythema  Psychiatric: normal mood and affect, alert and oriented to self, place, persons and time.           Imaging reviewed & Visualized by me:  DIAGNOSTIC IMAGING REVIEWED AND/OR INTERPRETED BY ME:        Laboratory:   Creat 1.1 -1.3 -1.3  Na 138 -128 -134 -143             Assessment and Plan:         1. Stage 3b chronic kidney disease (HCC)      crat level stable at baseline      to monitor     2. Essential hypertension      BP well controlled      3. Anemia, unspecified type      Well controlled      Hb WNL     4. Vitamin D deficiency      vit D well controlled        5. Microalbuminuria      negative     6. Hyponatremia: well controlled         Recs       Continue current treatment  Avoid free water  Monitor BP and call clinic if BP > 135/85  Increase solid food intake  Endocrinology evaluation  RTC 4 months    I spent total of 15 minutes on face-to-face via telemonitor more than half of which was spent coordinating this with patient as well as reviewing the images and labs results with the patient and answering all questions and explaining in details the assessment and recommendation sections above. The patient  expressed herunderstanding and agreement to the above.        This EM service was conducted utilizing secure and encrypted videoconferencing equipment with the assistance of a trained tele-presenter " at the originating site.

## 2025-04-02 ENCOUNTER — APPOINTMENT (OUTPATIENT)
Dept: NEPHROLOGY | Facility: MEDICAL CENTER | Age: 85
End: 2025-04-02
Payer: MEDICARE

## 2025-04-02 VITALS
DIASTOLIC BLOOD PRESSURE: 67 MMHG | OXYGEN SATURATION: 93 % | TEMPERATURE: 98 F | SYSTOLIC BLOOD PRESSURE: 142 MMHG | HEIGHT: 59 IN | WEIGHT: 142.4 LBS | BODY MASS INDEX: 28.71 KG/M2 | HEART RATE: 76 BPM | RESPIRATION RATE: 16 BRPM

## 2025-04-02 DIAGNOSIS — E55.9 VITAMIN D DEFICIENCY: ICD-10-CM

## 2025-04-02 DIAGNOSIS — N18.32 STAGE 3B CHRONIC KIDNEY DISEASE: ICD-10-CM

## 2025-04-02 DIAGNOSIS — I10 ESSENTIAL HYPERTENSION: ICD-10-CM

## 2025-04-02 DIAGNOSIS — D64.9 ANEMIA, UNSPECIFIED TYPE: ICD-10-CM

## 2025-04-02 DIAGNOSIS — R80.9 MICROALBUMINURIA: ICD-10-CM

## 2025-04-02 DIAGNOSIS — E87.1 HYPONATREMIA: ICD-10-CM

## 2025-04-02 PROCEDURE — 3077F SYST BP >= 140 MM HG: CPT | Mod: 95 | Performed by: INTERNAL MEDICINE

## 2025-04-02 PROCEDURE — 3078F DIAST BP <80 MM HG: CPT | Mod: 95 | Performed by: INTERNAL MEDICINE

## 2025-04-02 PROCEDURE — 99214 OFFICE O/P EST MOD 30 MIN: CPT | Mod: 95 | Performed by: INTERNAL MEDICINE

## 2025-04-02 PROCEDURE — G2211 COMPLEX E/M VISIT ADD ON: HCPCS | Mod: 95 | Performed by: INTERNAL MEDICINE

## 2025-04-02 ASSESSMENT — ENCOUNTER SYMPTOMS
COUGH: 0
ORTHOPNEA: 0
WEIGHT LOSS: 0
ABDOMINAL PAIN: 0
PALPITATIONS: 0
HEMOPTYSIS: 0
WHEEZING: 0
FLANK PAIN: 0
FEVER: 0
NAUSEA: 0
DIARRHEA: 0
SINUS PAIN: 0
CHILLS: 0
EYES NEGATIVE: 1
SHORTNESS OF BREATH: 0
VOMITING: 0

## 2025-04-02 NOTE — PATIENT INSTRUCTIONS
Continue current treatment  Avoid free water  Monitor BP and call clinic if BP > 135/85  Increase solid food intake

## 2025-04-02 NOTE — PROGRESS NOTES
Telemedicine Note     Please note that I could not evaluate the patient in person at the site. All examination and evaluation of the patient and information gathering from the patient and/or the family were done jointly by the requesting physician, Dr. Luz Elena vasquez. providers found at the site and me via licensed and securely encrypted tele-video communication equipment with the assistance of a trained tele-presenter at the originating site.  As such, my assessments and recommendations are limited as far as such telecommnication allows.     Consulting MD: Christelle Romo M.D.    Consulting MD location: [place], [place]  Requesting Physician: Luz Elena vasquez. providers found  Patient name:      Rosa Umanzor  :                    1940   MRN:                   MRN@  [id number]  Data source:  Patient  Video Time:         15 minutes.   Originating site: [facility]  Originating site MA:      Date of Service: 2025     Chief Complaint:   Chief Complaint   Patient presents with    Chronic Kidney Disease        History of Present Illness:  Rosa Umanzor is a 84 y.o. female who presents today for f/u of CKD III b   Doing well, no complaints  No dysuria/hematuria/flank pain  CKD: creat level stable at -baseline -at 1.2  HTN: BP very well controlled  Seen in Allergy Clinic told allergic to protein   Hyponatremia -improved -well controlled  UA no protein/blood              ROS:  (Change to below. Must have 10 or more organ system for the highest level code).   Review of Systems   Constitutional:  Positive for malaise/fatigue. Negative for chills, fever and weight loss.   HENT:  Negative for congestion, hearing loss and sinus pain.    Eyes: Negative.    Respiratory:  Negative for cough, hemoptysis, shortness of breath and wheezing.    Cardiovascular:  Negative for chest pain, palpitations, orthopnea and leg swelling.   Gastrointestinal:  Negative for abdominal pain, diarrhea, nausea and vomiting.   Genitourinary:  Negative for dysuria,  flank pain, frequency, hematuria and urgency.   Skin: Negative.    All other systems reviewed and are negative.              Current Outpatient Medications:     fluocinonide (LIDEX) 0.05 % Cream, Apply 0.05 Applications topically one time as needed (itching)., Disp: , Rfl:     clobetasol (TEMOVATE) 0.05 % Ointment, Apply 0.05 Applications topically one time as needed (itching)., Disp: , Rfl:     lisinopril (PRINIVIL) 5 MG Tab, Take 1 Tablet by mouth every day., Disp: 90 Tablet, Rfl: 2    Multiple Vitamins-Minerals (MULTI COMPLETE PO), Take  by mouth every day., Disp: , Rfl:     Coenzyme Q10-Vitamin E 100-300 MG-UNIT Chew Tab, Chew., Disp: , Rfl:     Loratadine (CLARITIN) 10 MG Cap, Take  by mouth., Disp: , Rfl:     Probiotic Product (PROBIOTIC-10 PO), Take  by mouth., Disp: , Rfl:     albuterol 108 (90 Base) MCG/ACT Aero Soln inhalation aerosol, as needed., Disp: , Rfl:     nystatin (MYCOSTATIN) [id number] UNIT/GM Cream topical cream, 100,000 g., Disp: , Rfl:     ondansetron (ZOFRAN ODT) 4 MG TABLET DISPERSIBLE, 4 mg., Disp: , Rfl:     PAZEO 0.7 % Solution, 0.7 %., Disp: , Rfl:     Cyanocobalamin (VITAMIN B-12) 1000 MCG Tab, Take 1,000 mcg by mouth every day., Disp: , Rfl:     Cholecalciferol (VITAMIN D3) [id number] UNIT/GM Liquid, 4,000 Units every day., Disp: , Rfl:     Oxiconazole Nitrate 1 % Cream, Apply  topically., Disp: , Rfl:     Magnesium 400 MG Cap, Take 350 mg by mouth every day., Disp: , Rfl:         Past Medical History:  Past Medical History:   Diagnosis Date    Arthritis     Hyperlipidemia     Hypertension     Kidney disease        Allergies:   Allergies   Allergen Reactions    Amoxicillin     Bactrim Ds     Celexa     Cleocin-T     Codeine     Crestor [Rosuvastatin Calcium]     Cymbalta [Duloxetine Hcl]     Darvocet [Propoxyphene N-Apap]     Eggs Unspecified     Not sure    Fentanyl     Hydrocodone     Keflex     Milk Protein Extract Unspecified     Not sure    Niacin     Nucynta [Tapentadol]     Ultram  "[Tramadol]     Vicodin [Hydrocodone-Acetaminophen]     Vytorin     Wheat Unspecified     Not sure    Yeast-Related         Current Outpatient Medications:   reviewed        Physical Exam:   Vitals:   Vitals:    04/02/25 1110   BP: (!) 142/67   BP Location: Left arm   Patient Position: Sitting   BP Cuff Size: Adult   Pulse: 76   Resp: 16   Temp: 36.7 °C (98 °F)   TempSrc: Temporal   SpO2: 93%   Weight: 64.6 kg (142 lb 6.4 oz)   Height: 1.499 m (4' 11\")     Completed by medical staff  GEN: WDWN female NAD  HEENT: Hearings appears normal   NECK: normal ROM, supple  CV: RRR  PULM: good effort, no distress  ABD: soft, nontender, not distended.BS (+)  Ext: trace pedal edema  SKIN: no rash, no erythema  Psychiatric: normal mood and affect, alert and oriented to self, place, persons and time.           Imaging reviewed & Visualized by me:  DIAGNOSTIC IMAGING REVIEWED AND/OR INTERPRETED BY ME:        Laboratory:   Creat 1.1 -1.3 -1.3 -1.2  Na 138 -128 -134 -143 -135  Hb 14           Assessment and Plan:         1. Stage 3b chronic kidney disease (HCC)      crat level stable at baseline      to monitor     2. Essential hypertension      BP well controlled      3. Anemia, unspecified type      Well controlled      Hb WNL     4. Vitamin D deficiency      vit D well controlled        5. Microalbuminuria      negative     6. Hyponatremia: well controlled         Recs       Continue current treatment  Avoid free water  Monitor BP and call clinic if BP > 135/85  Increase solid food intake    RTC 6 months    I spent total of 15 minutes on face-to-face via telemonitor more than half of which was spent coordinating this with patient as well as reviewing the images and labs results with the patient and answering all questions and explaining in details the assessment and recommendation sections above. The patient  expressed herunderstanding and agreement to the above.        This EM service was conducted utilizing secure and encrypted " videoconferencing equipment with the assistance of a trained tele-presenter at the originating site.

## 2025-04-08 ENCOUNTER — TELEPHONE (OUTPATIENT)
Dept: NEPHROLOGY | Facility: MEDICAL CENTER | Age: 85
End: 2025-04-08
Payer: MEDICARE

## 2025-04-08 NOTE — TELEPHONE ENCOUNTER
Pt called and stated that the urine test results came back and she would like them reviewed since they were pending at the time of the appt last week. Lab results were scanned in [facility] on 4/3/25. Please contact pt with results.   86

## 2025-04-24 ENCOUNTER — TELEPHONE (OUTPATIENT)
Dept: NEPHROLOGY | Facility: MEDICAL CENTER | Age: 85
End: 2025-04-24
Payer: MEDICARE

## 2025-04-24 NOTE — TELEPHONE ENCOUNTER
Pt is having shoulder pain and is wondering if its ok to take tylenol. If so how much and how often.   Pls call:  [phone]